# Patient Record
Sex: MALE | Race: WHITE | Employment: FULL TIME | URBAN - METROPOLITAN AREA
[De-identification: names, ages, dates, MRNs, and addresses within clinical notes are randomized per-mention and may not be internally consistent; named-entity substitution may affect disease eponyms.]

---

## 2018-04-17 ENCOUNTER — HOSPITAL ENCOUNTER (OUTPATIENT)
Dept: PHYSICAL THERAPY | Age: 48
Setting detail: THERAPIES SERIES
Discharge: HOME | End: 2018-04-17
Payer: OTHER GOVERNMENT

## 2018-04-17 PROCEDURE — G8981 BODY POS CURRENT STATUS: HCPCS | Performed by: PHYSICAL THERAPIST

## 2018-04-17 PROCEDURE — G8982 BODY POS GOAL STATUS: HCPCS | Performed by: PHYSICAL THERAPIST

## 2018-04-17 PROCEDURE — 97110 THERAPEUTIC EXERCISES: CPT | Performed by: PHYSICAL THERAPIST

## 2018-04-17 PROCEDURE — G0283 ELEC STIM OTHER THAN WOUND: HCPCS | Performed by: PHYSICAL THERAPIST

## 2018-04-17 PROCEDURE — 97161 PT EVAL LOW COMPLEX 20 MIN: CPT | Performed by: PHYSICAL THERAPIST

## 2018-04-17 ASSESSMENT — PAIN DESCRIPTION - PROGRESSION: CLINICAL_PROGRESSION: GRADUALLY IMPROVING

## 2018-04-17 ASSESSMENT — PAIN DESCRIPTION - ONSET: ONSET: SUDDEN

## 2018-04-17 ASSESSMENT — PAIN DESCRIPTION - DESCRIPTORS: DESCRIPTORS: TENDER;SHARP

## 2018-04-17 ASSESSMENT — PAIN DESCRIPTION - ORIENTATION: ORIENTATION: LEFT

## 2018-04-17 ASSESSMENT — PAIN DESCRIPTION - LOCATION: LOCATION: BACK

## 2018-04-17 ASSESSMENT — PAIN SCALES - GENERAL: PAINLEVEL_OUTOF10: 4

## 2018-04-17 ASSESSMENT — PAIN DESCRIPTION - PAIN TYPE: TYPE: ACUTE PAIN

## 2018-04-20 ENCOUNTER — HOSPITAL ENCOUNTER (OUTPATIENT)
Dept: PHYSICAL THERAPY | Age: 48
Setting detail: THERAPIES SERIES
Discharge: HOME | End: 2018-04-20
Payer: OTHER GOVERNMENT

## 2018-04-24 ENCOUNTER — HOSPITAL ENCOUNTER (OUTPATIENT)
Dept: PHYSICAL THERAPY | Age: 48
Setting detail: THERAPIES SERIES
Discharge: HOME | End: 2018-04-24
Payer: OTHER GOVERNMENT

## 2018-04-24 PROCEDURE — 97110 THERAPEUTIC EXERCISES: CPT | Performed by: PHYSICAL THERAPIST

## 2018-04-24 PROCEDURE — G0283 ELEC STIM OTHER THAN WOUND: HCPCS | Performed by: PHYSICAL THERAPIST

## 2018-05-01 ENCOUNTER — HOSPITAL ENCOUNTER (OUTPATIENT)
Dept: PHYSICAL THERAPY | Age: 48
Setting detail: THERAPIES SERIES
Discharge: HOME | End: 2018-05-01
Payer: OTHER GOVERNMENT

## 2018-05-01 PROCEDURE — 97110 THERAPEUTIC EXERCISES: CPT | Performed by: PHYSICAL THERAPIST

## 2018-05-01 PROCEDURE — G0283 ELEC STIM OTHER THAN WOUND: HCPCS | Performed by: PHYSICAL THERAPIST

## 2018-05-08 ENCOUNTER — HOSPITAL ENCOUNTER (OUTPATIENT)
Dept: PHYSICAL THERAPY | Age: 48
Setting detail: THERAPIES SERIES
Discharge: HOME | End: 2018-05-08
Payer: OTHER GOVERNMENT

## 2018-05-08 PROCEDURE — 97110 THERAPEUTIC EXERCISES: CPT | Performed by: PHYSICAL THERAPIST

## 2018-05-08 PROCEDURE — G0283 ELEC STIM OTHER THAN WOUND: HCPCS | Performed by: PHYSICAL THERAPIST

## 2018-05-15 ENCOUNTER — HOSPITAL ENCOUNTER (OUTPATIENT)
Dept: PHYSICAL THERAPY | Age: 48
Setting detail: THERAPIES SERIES
Discharge: HOME | End: 2018-05-15
Payer: OTHER GOVERNMENT

## 2018-05-15 PROCEDURE — G0283 ELEC STIM OTHER THAN WOUND: HCPCS | Performed by: PHYSICAL THERAPIST

## 2018-05-15 PROCEDURE — 97110 THERAPEUTIC EXERCISES: CPT | Performed by: PHYSICAL THERAPIST

## 2018-05-18 ENCOUNTER — HOSPITAL ENCOUNTER (OUTPATIENT)
Dept: PHYSICAL THERAPY | Age: 48
Setting detail: THERAPIES SERIES
Discharge: HOME | End: 2018-05-18
Payer: OTHER GOVERNMENT

## 2018-05-18 NOTE — PROGRESS NOTES
[address]      Phone: [phone]  Fax: [phone]    Physical Therapy  Cancellation/No-show Note  Patient Name:  Poncho Cruz  :  1970   Date:  2018    For today's appointment patient:  [x]  Cancelled  []  Rescheduled appointment  []  No-show     Reason given by patient:  []  Patient ill  []  Conflicting appointment  []  No transportation    [x]  Conflict with work  []  No reason given  []  Other:     Comments:      Electronically signed by:  Ana Lemons, PT [id number]

## 2018-05-21 ENCOUNTER — OFFICE VISIT (OUTPATIENT)
Dept: VASCULAR SURGERY | Age: 48
End: 2018-05-21
Payer: COMMERCIAL

## 2018-05-21 DIAGNOSIS — I87.2 VENOUS INSUFFICIENCY OF LEFT LEG: Primary | ICD-10-CM

## 2018-05-21 PROCEDURE — G8419 CALC BMI OUT NRM PARAM NOF/U: HCPCS | Performed by: SURGERY

## 2018-05-21 PROCEDURE — 4004F PT TOBACCO SCREEN RCVD TLK: CPT | Performed by: SURGERY

## 2018-05-21 PROCEDURE — G8427 DOCREV CUR MEDS BY ELIG CLIN: HCPCS | Performed by: SURGERY

## 2018-05-21 PROCEDURE — 99213 OFFICE O/P EST LOW 20 MIN: CPT | Performed by: SURGERY

## 2018-05-21 RX ORDER — TIZANIDINE 4 MG/1
4 TABLET ORAL PRN
COMMUNITY
End: 2019-09-21

## 2018-05-25 ENCOUNTER — HOSPITAL ENCOUNTER (OUTPATIENT)
Dept: PHYSICAL THERAPY | Age: 48
Setting detail: THERAPIES SERIES
Discharge: HOME | End: 2018-05-25
Payer: OTHER GOVERNMENT

## 2018-05-25 NOTE — PROGRESS NOTES
[address] [facility]                Phone: [phone]  Fax: [phone]    Physical Therapy  Cancellation/No-show Note  Patient Name:  Kandy Mayes  :  1970   Date:  2018    For today's appointment patient:  [x]  Cancelled  []  Rescheduled appointment  []  No-show     Reason given by patient:  []  Patient ill  []  Conflicting appointment  []  No transportation    []  Conflict with work  []  No reason given  [x]  Other:   Out of town         Electronically signed by:    Rafael Ramsey DPT  [id number]

## 2018-05-29 ENCOUNTER — HOSPITAL ENCOUNTER (OUTPATIENT)
Dept: PHYSICAL THERAPY | Age: 48
Setting detail: THERAPIES SERIES
Discharge: HOME | End: 2018-05-29
Payer: OTHER GOVERNMENT

## 2018-05-29 PROCEDURE — 97110 THERAPEUTIC EXERCISES: CPT | Performed by: PHYSICAL THERAPIST

## 2018-05-29 PROCEDURE — G0283 ELEC STIM OTHER THAN WOUND: HCPCS | Performed by: PHYSICAL THERAPIST

## 2018-06-05 ENCOUNTER — HOSPITAL ENCOUNTER (OUTPATIENT)
Dept: PHYSICAL THERAPY | Age: 48
Setting detail: THERAPIES SERIES
Discharge: HOME | End: 2018-06-05
Payer: OTHER GOVERNMENT

## 2018-06-05 PROCEDURE — G0283 ELEC STIM OTHER THAN WOUND: HCPCS | Performed by: PHYSICAL THERAPIST

## 2018-06-05 PROCEDURE — 97110 THERAPEUTIC EXERCISES: CPT | Performed by: PHYSICAL THERAPIST

## 2018-06-12 ENCOUNTER — HOSPITAL ENCOUNTER (OUTPATIENT)
Dept: PHYSICAL THERAPY | Age: 48
Setting detail: THERAPIES SERIES
Discharge: HOME | End: 2018-06-12
Payer: OTHER GOVERNMENT

## 2018-06-12 PROCEDURE — G0283 ELEC STIM OTHER THAN WOUND: HCPCS | Performed by: PHYSICAL THERAPIST

## 2018-06-12 PROCEDURE — 97110 THERAPEUTIC EXERCISES: CPT | Performed by: PHYSICAL THERAPIST

## 2018-06-26 ENCOUNTER — HOSPITAL ENCOUNTER (OUTPATIENT)
Dept: PHYSICAL THERAPY | Age: 48
Setting detail: THERAPIES SERIES
Discharge: HOME | End: 2018-06-26
Payer: OTHER GOVERNMENT

## 2018-06-26 NOTE — PROGRESS NOTES
[address]      Phone: [phone]  Fax: [phone]    Physical Therapy  Cancellation/No-show Note  Patient Name:  Dora Love  :  1970   Date:  2018    For today's appointment patient:  [x]  Cancelled  []  Rescheduled appointment  []  No-show     Reason given by patient:  []  Patient ill  []  Conflicting appointment  []  No transportation    []  Conflict with work  []  No reason given  [x]  Other:     Comments:  Waiting on confirmation for approval of more visits from worker's comp    Electronically signed by:  Jose De Santiago, [address]

## 2018-07-03 ENCOUNTER — APPOINTMENT (OUTPATIENT)
Dept: PHYSICAL THERAPY | Age: 48
End: 2018-07-03
Payer: OTHER GOVERNMENT

## 2018-07-06 ENCOUNTER — HOSPITAL ENCOUNTER (OUTPATIENT)
Dept: PHYSICAL THERAPY | Age: 48
Setting detail: THERAPIES SERIES
Discharge: HOME | End: 2018-07-06
Payer: OTHER GOVERNMENT

## 2018-07-06 PROCEDURE — 97110 THERAPEUTIC EXERCISES: CPT | Performed by: PHYSICAL THERAPIST

## 2018-07-06 PROCEDURE — 97140 MANUAL THERAPY 1/> REGIONS: CPT | Performed by: PHYSICAL THERAPIST

## 2018-07-06 PROCEDURE — G0283 ELEC STIM OTHER THAN WOUND: HCPCS | Performed by: PHYSICAL THERAPIST

## 2018-07-10 ENCOUNTER — HOSPITAL ENCOUNTER (OUTPATIENT)
Dept: PHYSICAL THERAPY | Age: 48
Setting detail: THERAPIES SERIES
Discharge: HOME | End: 2018-07-10
Payer: OTHER GOVERNMENT

## 2018-07-10 PROCEDURE — G0283 ELEC STIM OTHER THAN WOUND: HCPCS | Performed by: PHYSICAL THERAPIST

## 2018-07-10 PROCEDURE — 97110 THERAPEUTIC EXERCISES: CPT | Performed by: PHYSICAL THERAPIST

## 2018-07-13 ENCOUNTER — HOSPITAL ENCOUNTER (OUTPATIENT)
Dept: PHYSICAL THERAPY | Age: 48
Setting detail: THERAPIES SERIES
Discharge: HOME | End: 2018-07-13
Payer: OTHER GOVERNMENT

## 2018-07-13 PROCEDURE — G0283 ELEC STIM OTHER THAN WOUND: HCPCS | Performed by: PHYSICAL THERAPIST

## 2018-07-13 PROCEDURE — 97140 MANUAL THERAPY 1/> REGIONS: CPT | Performed by: PHYSICAL THERAPIST

## 2018-07-13 PROCEDURE — 97530 THERAPEUTIC ACTIVITIES: CPT | Performed by: PHYSICAL THERAPIST
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
## 2018-07-20 ENCOUNTER — HOSPITAL ENCOUNTER (OUTPATIENT)
Dept: PHYSICAL THERAPY | Age: 48
Setting detail: THERAPIES SERIES
Discharge: HOME | End: 2018-07-20
Payer: OTHER GOVERNMENT

## 2018-07-20 PROCEDURE — 97110 THERAPEUTIC EXERCISES: CPT | Performed by: PHYSICAL THERAPIST

## 2018-07-20 PROCEDURE — G0283 ELEC STIM OTHER THAN WOUND: HCPCS | Performed by: PHYSICAL THERAPIST
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
## 2018-07-24 ENCOUNTER — HOSPITAL ENCOUNTER (OUTPATIENT)
Dept: PHYSICAL THERAPY | Age: 48
Setting detail: THERAPIES SERIES
Discharge: HOME | End: 2018-07-24
Payer: OTHER GOVERNMENT

## 2018-07-27 ENCOUNTER — HOSPITAL ENCOUNTER (OUTPATIENT)
Dept: PHYSICAL THERAPY | Age: 48
Setting detail: THERAPIES SERIES
Discharge: HOME | End: 2018-07-27
Payer: OTHER GOVERNMENT

## 2018-07-27 PROCEDURE — G0283 ELEC STIM OTHER THAN WOUND: HCPCS | Performed by: PHYSICAL THERAPIST

## 2018-07-27 PROCEDURE — 97110 THERAPEUTIC EXERCISES: CPT | Performed by: PHYSICAL THERAPIST
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
## 2018-08-03 ENCOUNTER — HOSPITAL ENCOUNTER (OUTPATIENT)
Dept: PHYSICAL THERAPY | Age: 48
Setting detail: THERAPIES SERIES
Discharge: HOME | End: 2018-08-03

## 2018-08-03 PROCEDURE — 97110 THERAPEUTIC EXERCISES: CPT | Performed by: PHYSICAL THERAPIST

## 2018-08-03 PROCEDURE — G0283 ELEC STIM OTHER THAN WOUND: HCPCS | Performed by: PHYSICAL THERAPIST

## 2018-08-07 ENCOUNTER — HOSPITAL ENCOUNTER (OUTPATIENT)
Dept: PHYSICAL THERAPY | Age: 48
Setting detail: THERAPIES SERIES
Discharge: HOME | End: 2018-08-07

## 2018-08-07 PROCEDURE — 97530 THERAPEUTIC ACTIVITIES: CPT

## 2018-08-07 PROCEDURE — G0283 ELEC STIM OTHER THAN WOUND: HCPCS
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
## 2018-08-24 ENCOUNTER — HOSPITAL ENCOUNTER (OUTPATIENT)
Dept: PHYSICAL THERAPY | Age: 48
Setting detail: THERAPIES SERIES
Discharge: HOME | End: 2018-08-24

## 2018-08-24 PROCEDURE — W0710 HC WORK COND PROG PER 15 MIN: HCPCS | Performed by: PHYSICAL THERAPIST

## 2018-08-28 ENCOUNTER — HOSPITAL ENCOUNTER (OUTPATIENT)
Dept: PHYSICAL THERAPY | Age: 48
Setting detail: THERAPIES SERIES
Discharge: HOME | End: 2018-08-28

## 2018-08-28 PROCEDURE — W0710 HC WORK COND PROG PER 15 MIN: HCPCS | Performed by: PHYSICAL THERAPIST
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
## 2018-08-31 ENCOUNTER — HOSPITAL ENCOUNTER (OUTPATIENT)
Dept: PHYSICAL THERAPY | Age: 48
Setting detail: THERAPIES SERIES
Discharge: HOME | End: 2018-08-31

## 2018-08-31 NOTE — PROGRESS NOTES
[address]      Phone: [phone]  Fax: [phone]    Physical Therapy  Cancellation/No-show Note  Patient Name:  Serena Hernandez  :  1970   Date:  2018    For today's appointment patient:  [x]  Cancelled  []  Rescheduled appointment  []  No-show     Reason given by patient:  []  Patient ill  [x]  Conflicting appointment  []  No transportation    []  Conflict with work  []  No reason given  []  Other:     Comments:      Electronically signed by:  Norma Farmer, PT [id number]

## 2018-09-04 ENCOUNTER — HOSPITAL ENCOUNTER (OUTPATIENT)
Dept: PHYSICAL THERAPY | Age: 48
Setting detail: THERAPIES SERIES
Discharge: HOME | End: 2018-09-04
Payer: COMMERCIAL

## 2018-09-04 PROCEDURE — W0710 HC WORK COND PROG PER 15 MIN: HCPCS
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
## 2018-09-07 ENCOUNTER — HOSPITAL ENCOUNTER (OUTPATIENT)
Dept: PHYSICAL THERAPY | Age: 48
Setting detail: THERAPIES SERIES
Discharge: HOME | End: 2018-09-07
Payer: COMMERCIAL

## 2018-09-14 ENCOUNTER — HOSPITAL ENCOUNTER (OUTPATIENT)
Dept: PHYSICAL THERAPY | Age: 48
Setting detail: THERAPIES SERIES
Discharge: HOME | End: 2018-09-14
Payer: COMMERCIAL

## 2018-09-14 PROCEDURE — G8983 BODY POS D/C STATUS: HCPCS | Performed by: PHYSICAL THERAPIST

## 2018-09-14 PROCEDURE — 97530 THERAPEUTIC ACTIVITIES: CPT | Performed by: PHYSICAL THERAPIST

## 2018-09-14 PROCEDURE — G8982 BODY POS GOAL STATUS: HCPCS | Performed by: PHYSICAL THERAPIST
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
## 2018-12-17 ENCOUNTER — HOSPITAL ENCOUNTER (EMERGENCY)
Age: 48
Discharge: HOME | End: 2018-12-17

## 2018-12-17 ENCOUNTER — APPOINTMENT (OUTPATIENT)
Dept: GENERAL RADIOLOGY | Age: 48
End: 2018-12-17

## 2018-12-17 VITALS
TEMPERATURE: 98.3 F | DIASTOLIC BLOOD PRESSURE: 90 MMHG | RESPIRATION RATE: 16 BRPM | OXYGEN SATURATION: 98 % | WEIGHT: 240 LBS | HEART RATE: 71 BPM | HEIGHT: 75 IN | BODY MASS INDEX: 29.84 KG/M2 | SYSTOLIC BLOOD PRESSURE: 144 MMHG

## 2018-12-17 DIAGNOSIS — S90.31XA CONTUSION OF RIGHT HEEL, INITIAL ENCOUNTER: Primary | ICD-10-CM

## 2018-12-17 PROCEDURE — 73650 X-RAY EXAM OF HEEL: CPT

## 2018-12-17 PROCEDURE — 99283 EMERGENCY DEPT VISIT LOW MDM: CPT

## 2018-12-17 ASSESSMENT — PAIN DESCRIPTION - LOCATION: LOCATION: FOOT

## 2018-12-17 ASSESSMENT — PAIN SCALES - GENERAL: PAINLEVEL_OUTOF10: 10

## 2018-12-17 ASSESSMENT — PAIN DESCRIPTION - ORIENTATION: ORIENTATION: RIGHT

## 2018-12-17 ASSESSMENT — PAIN SCALES - WONG BAKER: WONGBAKER_NUMERICALRESPONSE: 0
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
## 2019-09-21 ENCOUNTER — HOSPITAL ENCOUNTER (EMERGENCY)
Age: 49
Discharge: HOME | End: 2019-09-21
Attending: EMERGENCY MEDICINE
Payer: COMMERCIAL

## 2019-09-21 VITALS
DIASTOLIC BLOOD PRESSURE: 94 MMHG | HEART RATE: 88 BPM | HEIGHT: 75 IN | SYSTOLIC BLOOD PRESSURE: 168 MMHG | WEIGHT: 255 LBS | BODY MASS INDEX: 31.71 KG/M2 | TEMPERATURE: 98.5 F | RESPIRATION RATE: 16 BRPM | OXYGEN SATURATION: 97 %

## 2019-09-21 DIAGNOSIS — S46.812A STRAIN OF LEFT TRAPEZIUS MUSCLE, INITIAL ENCOUNTER: Primary | ICD-10-CM

## 2019-09-21 DIAGNOSIS — T14.8XXA MUSCLE STRAIN: ICD-10-CM

## 2019-09-21 PROCEDURE — 99283 EMERGENCY DEPT VISIT LOW MDM: CPT

## 2019-09-21 PROCEDURE — 96372 THER/PROPH/DIAG INJ SC/IM: CPT

## 2019-09-21 PROCEDURE — 6360000002 HC RX W HCPCS: Performed by: EMERGENCY MEDICINE

## 2019-09-21 RX ORDER — KETOROLAC TROMETHAMINE 10 MG/1
10 TABLET, FILM COATED ORAL EVERY 6 HOURS PRN
Qty: 20 TABLET | Refills: 0 | Status: SHIPPED | OUTPATIENT
Start: 2019-09-21 | End: 2021-06-18

## 2019-09-21 RX ORDER — CYCLOBENZAPRINE HCL 10 MG
10 TABLET ORAL 3 TIMES DAILY PRN
Qty: 15 TABLET | Refills: 0 | Status: SHIPPED | OUTPATIENT
Start: 2019-09-21 | End: 2019-09-26

## 2019-09-21 RX ORDER — KETOROLAC TROMETHAMINE 30 MG/ML
30 INJECTION, SOLUTION INTRAMUSCULAR; INTRAVENOUS ONCE
Status: COMPLETED | OUTPATIENT
Start: 2019-09-21 | End: 2019-09-21

## 2019-09-21 RX ORDER — ORPHENADRINE CITRATE 30 MG/ML
60 INJECTION INTRAMUSCULAR; INTRAVENOUS ONCE
Status: COMPLETED | OUTPATIENT
Start: 2019-09-21 | End: 2019-09-21

## 2019-09-21 RX ADMIN — KETOROLAC TROMETHAMINE 30 MG: 30 INJECTION, SOLUTION INTRAMUSCULAR; INTRAVENOUS at 08:33

## 2019-09-21 RX ADMIN — ORPHENADRINE CITRATE 60 MG: 30 INJECTION INTRAMUSCULAR; INTRAVENOUS at 08:34

## 2019-09-21 ASSESSMENT — PAIN SCALES - GENERAL: PAINLEVEL_OUTOF10: 8

## 2019-09-21 ASSESSMENT — PAIN DESCRIPTION - ORIENTATION: ORIENTATION: LEFT

## 2019-09-21 ASSESSMENT — PAIN DESCRIPTION - DESCRIPTORS: DESCRIPTORS: SORE

## 2019-09-21 ASSESSMENT — PAIN DESCRIPTION - PAIN TYPE: TYPE: ACUTE PAIN

## 2019-09-21 ASSESSMENT — PAIN DESCRIPTION - FREQUENCY: FREQUENCY: CONTINUOUS

## 2019-09-21 ASSESSMENT — PAIN DESCRIPTION - LOCATION: LOCATION: BACK

## 2019-09-21 NOTE — ED NOTES
Discharge instructions given, patient verbalized their understanding, no other noted or stated problems at this time. Patient will follow up with primary doctor for care.      Cornelia Guillaume RN  09/21/19 9722

## 2021-02-11 ENCOUNTER — APPOINTMENT (OUTPATIENT)
Dept: CT IMAGING | Age: 51
End: 2021-02-11
Payer: OTHER GOVERNMENT

## 2021-02-11 ENCOUNTER — HOSPITAL ENCOUNTER (EMERGENCY)
Age: 51
Discharge: HOME | End: 2021-02-11
Attending: FAMILY MEDICINE
Payer: OTHER GOVERNMENT

## 2021-02-11 VITALS
WEIGHT: 242 LBS | HEART RATE: 88 BPM | OXYGEN SATURATION: 99 % | HEIGHT: 75 IN | DIASTOLIC BLOOD PRESSURE: 84 MMHG | SYSTOLIC BLOOD PRESSURE: 150 MMHG | RESPIRATION RATE: 16 BRPM | BODY MASS INDEX: 30.09 KG/M2 | TEMPERATURE: 96 F

## 2021-02-11 DIAGNOSIS — S39.012A LUMBOSACRAL STRAIN, INITIAL ENCOUNTER: ICD-10-CM

## 2021-02-11 DIAGNOSIS — M48.061 SPINAL STENOSIS AT L4-L5 LEVEL: Primary | ICD-10-CM
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
## 2021-02-11 DIAGNOSIS — M62.838 SPASM OF MUSCLE: ICD-10-CM

## 2021-02-11 PROCEDURE — 96372 THER/PROPH/DIAG INJ SC/IM: CPT

## 2021-02-11 PROCEDURE — 72131 CT LUMBAR SPINE W/O DYE: CPT

## 2021-02-11 PROCEDURE — 6360000002 HC RX W HCPCS: Performed by: PHYSICIAN ASSISTANT

## 2021-02-11 PROCEDURE — 99283 EMERGENCY DEPT VISIT LOW MDM: CPT

## 2021-02-11 RX ORDER — METHOCARBAMOL 500 MG/1
500 TABLET, FILM COATED ORAL 4 TIMES DAILY
Qty: 40 TABLET | Refills: 0 | Status: SHIPPED | OUTPATIENT
Start: 2021-02-11 | End: 2021-02-21

## 2021-02-11 RX ORDER — HYDROCODONE BITARTRATE AND ACETAMINOPHEN 5; 325 MG/1; MG/1
1 TABLET ORAL EVERY 6 HOURS PRN
Qty: 10 TABLET | Refills: 0 | Status: SHIPPED | OUTPATIENT
Start: 2021-02-11 | End: 2021-02-14

## 2021-02-11 RX ORDER — NAPROXEN 500 MG/1
500 TABLET ORAL 2 TIMES DAILY
Qty: 60 TABLET | Refills: 0 | Status: SHIPPED | OUTPATIENT
Start: 2021-02-11 | End: 2021-06-18

## 2021-02-11 RX ORDER — KETOROLAC TROMETHAMINE 30 MG/ML
30 INJECTION, SOLUTION INTRAMUSCULAR; INTRAVENOUS ONCE
Status: COMPLETED | OUTPATIENT
Start: 2021-02-11 | End: 2021-02-11

## 2021-02-11 RX ORDER — ORPHENADRINE CITRATE 30 MG/ML
60 INJECTION INTRAMUSCULAR; INTRAVENOUS ONCE
Status: COMPLETED | OUTPATIENT
Start: 2021-02-11 | End: 2021-02-11

## 2021-02-11 RX ORDER — PREDNISONE 20 MG/1
20 TABLET ORAL DAILY
COMMUNITY
End: 2021-06-18

## 2021-02-11 RX ADMIN — ORPHENADRINE CITRATE 60 MG: 60 INJECTION INTRAMUSCULAR; INTRAVENOUS at 12:34

## 2021-02-11 RX ADMIN — KETOROLAC TROMETHAMINE 30 MG: 30 INJECTION, SOLUTION INTRAMUSCULAR at 12:34

## 2021-02-11 ASSESSMENT — PAIN DESCRIPTION - DESCRIPTORS: DESCRIPTORS: DISCOMFORT

## 2021-02-11 ASSESSMENT — PAIN DESCRIPTION - LOCATION: LOCATION: BACK;BUTTOCKS

## 2021-02-11 NOTE — ED PROVIDER NOTES
ED Attending  CC: No       [address] [facility]  Department of Emergency Medicine   ED  Encounter Note  Admit Date/RoomTime: 2021 12:08 PM  ED Room:     NAME: Kamlia Astudillo  : 1970  MRN: [id number]     Chief Complaint:  Back Pain (slipped on ice fell onto back and tailbone)    History of Present Illness Mayo Lugo is a 52 y.o. old male with a prior history of no prior back problems, presents to the emergency department by private vehicle, for traumatic acute, aching, sharp and stabbing left greater than right, paraspinal lower lumbar spine and sacral spine pain without radiation, for 2 hour(s) prior to arrival.  There has been recent injury as it relates to today's visit. 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 Patient states he immediate was able to get up but does have a lot of pain that is worsening over the last few hours. Patient is able to ambulate but has pain. Patient has no numbness or tingling or weakness down his extremities bilaterally. Patient has no loss of bowel or bladder function or perianal or sacral numbness or tingling. Since onset the symptoms have been worsening and is moderate in severity. He has associated signs & symptoms of nothing additional.   He denies any bladder or bowel incontinence , new weakness, tingling or paresthesias, recent back injection, recent spinal surgery, recent spinal/chiropractic manipulation, history of IVDA, fever, abdominal pain, bladder incontinence, bowel incontinence, bladder retention, bladder urgency, bowel urgency, saddle paresthesias  or sacral numbness. The pain is aggraveated by any movement and relieved by nothing in particular. He is not currently enrolled in an pain management program or managed by PCP or back specialist.  Patient did not hit his head or lose consciousness. Patient is not any blood thinners. Patient did not take anything before coming here today. His vaccinations are all up-to-date. ROS   Pertinent positives and negatives are stated within HPI, all other systems reviewed and are negative. Past Medical History:  has a past medical history of Dog bite, H/O blood clots, Lupus ([facility] [address] and RA (rheumatoid arthritis) ([facility] [address] Surgical History:  has a past surgical history that includes Appendectomy; Cholecystectomy; Mastoid surgery; [place] tooth extraction; other surgical history (4/15/16); and Knee cartilage surgery (Left, 06/2017). Social History:  reports that he quit smoking about 26 years ago. He smoked 0.50 packs per day. His smokeless tobacco use includes chew. He reports current alcohol use of about 6.0 standard drinks of alcohol per week. He reports that he does not use drugs. Family History: family history includes Cancer in his maternal grandfather. Allergies: Patient has no known allergies. Physical Exam   Oxygen Saturation Interpretation: Normal.        ED Triage Vitals   BP Temp Temp Source Pulse Resp SpO2 Height Weight   02/11/21 1207 02/11/21 1207 02/11/21 1207 02/11/21 1207 02/11/21 1207 02/11/21 1207 02/11/21 1211 02/11/21 1211   (!) 150/84 96 °F (35.6 °C) Temporal 88 16 99 % 6' 3\" (1.905 m) 242 lb (109.8 kg)         Constitutional:  Alert, development consistent with age. NAD  HEENT:  NC/NT. Airway patent. No retropharyngeal or peritonsillar abscess  Neck:  Normal ROM. Supple. Respiratory:  Clear to auscultation and breath sounds equal.  CV:  Regular rate and rhythm, normal heart sounds, without pathological murmurs, ectopy, gallops, or rubs. No chest wall pain, no rib pain. No pain with deep inspiration. GI:  Abdomen Soft, nontender, good bowel sounds. No firm or pulsatile mass. Nonsurgical abdomen  Back: lower lumbar spine and sacral spine left greater than right and bilateral.             Tenderness: Moderate. Swelling: no.              Range of Motion: full range with pain. CVA Tenderness: No.            Straight leg raising:  Bilateral positive at 20 degrees. Skin:  no erythema, rash or swelling noted. No step-off deformities. No evidence of ecchymosis. No evidence of abscess. No evidence of any trauma. Patient is neurovascular and sensory intact  Distal Function:              Motor deficit: none. Sensory deficit: none. Pulse deficit: none. Calf Tenderness:  No Bilateral.               Edema:  none Both lower extremity(s). Deep Tendon Reflexes (DTR's) to bilateral knee's and ankle's are normo-reflexive   Gait:  normal.  Integument:  Normal turgor. Warm, dry, without visible rash. Lymphatics: No lymphangitis or adenopathy noted. Neurological:  Oriented. Motor functions intact. Lab / Imaging Results   (All laboratory and radiology results have been personally reviewed by myself)  Labs:  No results found for this visit on 02/11/21. Imaging: All Radiology results interpreted by Radiologist unless otherwise noted. CT LUMBAR SPINE WO CONTRAST   Final Result   1. No fracture or malalignment. 2.  Severe disc space narrowing at L4/L5 with central canal stenosis and   neural foraminal narrowing. MRI may be helpful for further evaluation of   lumbar spondylosis. ED Course / Medical Decision Making     Medications   ketorolac (TORADOL) injection 30 mg (30 mg Intramuscular Given 2/11/21 1234)   orphenadrine (NORFLEX) injection 60 mg (60 mg Intramuscular Given 2/11/21 1234)        Re-examination:  2/11/21       Time: 1240 patient did state that he drove here would like to try the Toradol first before having to call for a ride if more pain medication is needed. Patients condition remains unchanged.     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 Patient had a thorough examination and no step-off deformities were noted. Patient has no neurological deficits. Patient is able to ambulate but with pain. Patient denies any numbness or tingling or weakness down his lower extremities. Patient denies any perianal/sacral numbness or tingling. No loss of bowel or bladder function. Imaging was obtained based on moderate suspicion for fracture / bony abnormality, dislocation as per history/physical findings. Patient was noted to have at L4/L5 spinal stenosis. Patient will be diagnosed with a lumbar/sacral sprain as well as muscle spasms. Patient was educated that he was given Toradol and Norflex here in the emergency department he feels slightly better. Patient will be discharged home today with muscle relaxers that he can take during the day but was told he is unable to drive or work due to causing impairment. Patient is educated the risk, benefits and side effects of the medication he voiced understanding and agreed. Patient will also be given naproxen for which she was told alternate with Tylenol every 6-8 hours with food for decrease of his muscle inflammation. Patient will also be given a small dose of pain medication to only take for breakthrough pain. Patient was educated about this narcotic and his PDMP was checked and was found to be negative. Patient was educated that the risks such as narcotic abuse, constipation, nausea and vomiting with taking this medication he voiced understanding and accepted the full risks of taking this. Patient also was told he cannot drive on this medication. Patient will be set up with Workmen's Comp. to follow-up as an outpatient. Patient will be placed on light duty until he is able to be seen at [facility]. to be further assisted. Recommendations on CT scan are an MRI. Patient was educated but worsening signs symptoms and when to return back to the emergency department. 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 All Workmen's Comp. papers were filled out by myself and my attending physician. He voiced understanding and agreed with the plan and management. Patient is vitally stable and ready for outpatient follow-up. At this time the patient is without objective evidence of an acute process requiring inpatient management. History provided is without report of trauma, or neurological symptom. Exam shows evidence of NO radicular symptoms without myelopathy or neurovascular compromise. Patient has no significant risk for spontaneous infectious process and is afebrile & non-toxic. Given symptomatic therapy in ER and prescriptions for home along with appropriate instructions regarding taking medications with food and using caution for drowsiness and sedation. No alcohol or driving while taking medication. Any red flag symptoms were to return immediately to the ER for evaluation but otherwise PCP follow up for reevaluation. Plan of Care/Counseling:  I reviewed today's visit with the patient in addition to providing specific details for the plan of care and counseling regarding the diagnosis and prognosis. Questions are answered at this time and are agreeable with the plan. Assessment      1. Spinal stenosis at L4-L5 level    2. Fall due to slipping on ice or snow, initial encounter    3. Spasm of muscle    4. Lumbosacral strain, initial encounter      Plan   Discharge home.   Patient condition is stable    New Medications     Discharge Medication List as of 2/11/2021  2:20 PM      START taking these medications    Details methocarbamol (ROBAXIN) 500 MG tablet Take 1 tablet by mouth 4 times daily for 10 days Do not drive on this medication, Disp-40 tablet, R-0Print      HYDROcodone-acetaminophen (NORCO) 5-325 MG per tablet Take 1 tablet by mouth every 6 hours as needed for Pain for up to 3 days. Intended supply: 3 days. Take lowest dose possible to manage pain, Disp-10 tablet, R-0Print      naproxen (NAPROSYN) 500 MG tablet Take 1 tablet by mouth 2 times daily Please alternate with Tylenol 650 mg every 6-8 hours with food to help decrease the inflammation, Disp-60 tablet, R-0Print           Electronically signed by Eve Ybarra PA-C   DD: 2/11/21  **This report was transcribed using voice recognition software. Every effort was made to ensure accuracy; however, inadvertent computerized transcription errors may be present.   END OF ED PROVIDER NOTE        Eve Ybarra PA-C  02/11/21 3018

## 2021-02-11 NOTE — LETTER
[address] [facility] Emergency Department  [id number] [facility] [address]  Phone: [phone]               February 11, 2021    Patient: Tip Hair   YOB: 1970   Date of Visit: 2/11/2021       To Whom It May Concern:    Tip Hair was seen and treated in our emergency department on 2/11/2021. He may return to work on 02/15/2021.       Sincerely,       Gwen Noyola RN         Signature:__________________________________

## 2021-02-11 NOTE — ED NOTES
Patient phoned with questions regarding return to work. Chart accessed for this purpose.      Mariya Reddy RN  02/11/21 5270

## 2021-03-30 ENCOUNTER — HOSPITAL ENCOUNTER (OUTPATIENT)
Dept: MRI IMAGING | Age: 51
Discharge: HOME | End: 2021-04-01
Payer: OTHER GOVERNMENT

## 2021-03-30 DIAGNOSIS — S39.012D STRAIN OF LUMBAR REGION, SUBSEQUENT ENCOUNTER: ICD-10-CM

## 2021-03-30 PROCEDURE — 72148 MRI LUMBAR SPINE W/O DYE: CPT

## 2021-06-18 ENCOUNTER — HOSPITAL ENCOUNTER (EMERGENCY)
Age: 51
Discharge: ANOTHER HOSPITAL | End: 2021-06-18
Attending: EMERGENCY MEDICINE
Payer: COMMERCIAL

## 2021-06-18 ENCOUNTER — HOSPITAL ENCOUNTER (INPATIENT)
Age: 51
LOS: 1 days | Discharge: HOME | DRG: 301 | End: 2021-06-19
Attending: INTERNAL MEDICINE | Admitting: INTERNAL MEDICINE
Payer: COMMERCIAL

## 2021-06-18 VITALS
OXYGEN SATURATION: 100 % | TEMPERATURE: 98 F | DIASTOLIC BLOOD PRESSURE: 81 MMHG | HEART RATE: 100 BPM | WEIGHT: 242 LBS | SYSTOLIC BLOOD PRESSURE: 145 MMHG | HEIGHT: 75 IN | BODY MASS INDEX: 30.09 KG/M2 | RESPIRATION RATE: 16 BRPM

## 2021-06-18 DIAGNOSIS — I82.411 ACUTE DEEP VEIN THROMBOSIS (DVT) OF FEMORAL VEIN OF RIGHT LOWER EXTREMITY (HCC): Primary | ICD-10-CM

## 2021-06-18 PROBLEM — O22.30 DVT (DEEP VEIN THROMBOSIS) IN PREGNANCY: Status: ACTIVE | Noted: 2021-06-18

## 2021-06-18 LAB
ANION GAP SERPL CALCULATED.3IONS-SCNC: 7 MMOL/L (ref 7–16)
APTT: 30.5 SEC (ref 24.5–35.1)
APTT: 77.3 SEC (ref 24.5–35.1)
BUN BLDV-MCNC: 8 MG/DL (ref 6–20)
CALCIUM SERPL-MCNC: 8.4 MG/DL (ref 8.6–10.2)
CHLORIDE BLD-SCNC: 101 MMOL/L (ref 98–107)
CO2: 28 MMOL/L (ref 22–29)
CREAT SERPL-MCNC: 0.8 MG/DL (ref 0.7–1.2)
GFR AFRICAN AMERICAN: >60
GFR NON-AFRICAN AMERICAN: >60 ML/MIN/1.73
GLUCOSE BLD-MCNC: 113 MG/DL (ref 74–99)
HCT VFR BLD CALC: 41.8 % (ref 37–54)
HCT VFR BLD CALC: 44.5 % (ref 37–54)
HEMOGLOBIN: 13.8 G/DL (ref 12.5–16.5)
HEMOGLOBIN: 14.6 G/DL (ref 12.5–16.5)
INR BLD: 1.1
MCH RBC QN AUTO: 30.5 PG (ref 26–35)
MCH RBC QN AUTO: 30.7 PG (ref 26–35)
MCHC RBC AUTO-ENTMCNC: 32.8 % (ref 32–34.5)
MCHC RBC AUTO-ENTMCNC: 33 % (ref 32–34.5)
MCV RBC AUTO: 92.9 FL (ref 80–99.9)
MCV RBC AUTO: 93.1 FL (ref 80–99.9)
PDW BLD-RTO: 13.2 FL (ref 11.5–15)
PDW BLD-RTO: 13.6 FL (ref 11.5–15)
PLATELET # BLD: 80 E9/L (ref 130–450)
PLATELET # BLD: 80 E9/L (ref 130–450)
PLATELET CONFIRMATION: NORMAL
PMV BLD AUTO: 11.9 FL (ref 7–12)
PMV BLD AUTO: 12.4 FL (ref 7–12)
POTASSIUM SERPL-SCNC: 4.3 MMOL/L (ref 3.5–5)
PROTHROMBIN TIME: 11.5 SEC (ref 9.3–12.4)
RBC # BLD: 4.5 E12/L (ref 3.8–5.8)
RBC # BLD: 4.78 E12/L (ref 3.8–5.8)
REASON FOR REJECTION: NORMAL
REJECTED TEST: NORMAL
SARS-COV-2, NAAT: NOT DETECTED
SODIUM BLD-SCNC: 136 MMOL/L (ref 132–146)
WBC # BLD: 1.5 E9/L (ref 4.5–11.5)
WBC # BLD: 2.1 E9/L (ref 4.5–11.5)

## 2021-06-18 PROCEDURE — 87635 SARS-COV-2 COVID-19 AMP PRB: CPT

## 2021-06-18 PROCEDURE — 36415 COLL VENOUS BLD VENIPUNCTURE: CPT

## 2021-06-18 PROCEDURE — 2580000003 HC RX 258: Performed by: INTERNAL MEDICINE

## 2021-06-18 PROCEDURE — 96365 THER/PROPH/DIAG IV INF INIT: CPT

## 2021-06-18 PROCEDURE — 6360000002 HC RX W HCPCS: Performed by: EMERGENCY MEDICINE

## 2021-06-18 PROCEDURE — 85730 THROMBOPLASTIN TIME PARTIAL: CPT

## 2021-06-18 PROCEDURE — 96366 THER/PROPH/DIAG IV INF ADDON: CPT

## 2021-06-18 PROCEDURE — 96375 TX/PRO/DX INJ NEW DRUG ADDON: CPT

## 2021-06-18 PROCEDURE — 85027 COMPLETE CBC AUTOMATED: CPT

## 2021-06-18 PROCEDURE — 85610 PROTHROMBIN TIME: CPT

## 2021-06-18 PROCEDURE — 80048 BASIC METABOLIC PNL TOTAL CA: CPT

## 2021-06-18 PROCEDURE — 2060000000 HC ICU INTERMEDIATE R&B

## 2021-06-18 PROCEDURE — 99284 EMERGENCY DEPT VISIT MOD MDM: CPT

## 2021-06-18 PROCEDURE — 99253 IP/OBS CNSLTJ NEW/EST LOW 45: CPT | Performed by: SURGERY

## 2021-06-18 RX ORDER — SODIUM CHLORIDE 9 MG/ML
25 INJECTION, SOLUTION INTRAVENOUS PRN
Status: DISCONTINUED | OUTPATIENT
Start: 2021-06-18 | End: 2021-06-19 | Stop reason: HOSPADM

## 2021-06-18 RX ORDER — SODIUM CHLORIDE 0.9 % (FLUSH) 0.9 %
5-40 SYRINGE (ML) INJECTION EVERY 12 HOURS SCHEDULED
Status: DISCONTINUED | OUTPATIENT
Start: 2021-06-18 | End: 2021-06-19 | Stop reason: HOSPADM

## 2021-06-18 RX ORDER — POLYETHYLENE GLYCOL 3350 17 G/17G
17 POWDER, FOR SOLUTION ORAL DAILY PRN
Status: CANCELLED | OUTPATIENT
Start: 2021-06-18

## 2021-06-18 RX ORDER — HEPARIN SODIUM 1000 [USP'U]/ML
80 INJECTION, SOLUTION INTRAVENOUS; SUBCUTANEOUS PRN
Status: CANCELLED | OUTPATIENT
Start: 2021-06-18

## 2021-06-18 RX ORDER — SODIUM CHLORIDE 9 MG/ML
INJECTION, SOLUTION INTRAVENOUS CONTINUOUS
Status: DISCONTINUED | OUTPATIENT
Start: 2021-06-18 | End: 2021-06-19

## 2021-06-18 RX ORDER — HEPARIN SODIUM 1000 [USP'U]/ML
40 INJECTION, SOLUTION INTRAVENOUS; SUBCUTANEOUS PRN
Status: CANCELLED | OUTPATIENT
Start: 2021-06-18

## 2021-06-18 RX ORDER — POLYETHYLENE GLYCOL 3350 17 G/17G
17 POWDER, FOR SOLUTION ORAL DAILY PRN
Status: DISCONTINUED | OUTPATIENT
Start: 2021-06-18 | End: 2021-06-19 | Stop reason: HOSPADM

## 2021-06-18 RX ORDER — ONDANSETRON 2 MG/ML
4 INJECTION INTRAMUSCULAR; INTRAVENOUS EVERY 6 HOURS PRN
Status: CANCELLED | OUTPATIENT
Start: 2021-06-18

## 2021-06-18 RX ORDER — HEPARIN SODIUM 10000 [USP'U]/100ML
5-30 INJECTION, SOLUTION INTRAVENOUS CONTINUOUS
Status: DISCONTINUED | OUTPATIENT
Start: 2021-06-18 | End: 2021-06-19

## 2021-06-18 RX ORDER — HEPARIN SODIUM 10000 [USP'U]/100ML
5-30 INJECTION, SOLUTION INTRAVENOUS CONTINUOUS
Status: DISCONTINUED | OUTPATIENT
Start: 2021-06-18 | End: 2021-06-18 | Stop reason: HOSPADM

## 2021-06-18 RX ORDER — HEPARIN SODIUM 1000 [USP'U]/ML
40 INJECTION, SOLUTION INTRAVENOUS; SUBCUTANEOUS PRN
Status: DISCONTINUED | OUTPATIENT
Start: 2021-06-18 | End: 2021-06-18 | Stop reason: HOSPADM

## 2021-06-18 RX ORDER — ONDANSETRON 4 MG/1
4 TABLET, ORALLY DISINTEGRATING ORAL EVERY 8 HOURS PRN
Status: DISCONTINUED | OUTPATIENT
Start: 2021-06-18 | End: 2021-06-19 | Stop reason: HOSPADM

## 2021-06-18 RX ORDER — SODIUM CHLORIDE 9 MG/ML
INJECTION, SOLUTION INTRAVENOUS CONTINUOUS
Status: CANCELLED | OUTPATIENT
Start: 2021-06-18

## 2021-06-18 RX ORDER — HEPARIN SODIUM 1000 [USP'U]/ML
80 INJECTION, SOLUTION INTRAVENOUS; SUBCUTANEOUS PRN
Status: DISCONTINUED | OUTPATIENT
Start: 2021-06-18 | End: 2021-06-18 | Stop reason: HOSPADM

## 2021-06-18 RX ORDER — HYDROCODONE BITARTRATE AND ACETAMINOPHEN 10; 325 MG/1; MG/1
1 TABLET ORAL EVERY 4 HOURS PRN
Status: CANCELLED | OUTPATIENT
Start: 2021-06-18

## 2021-06-18 RX ORDER — HEPARIN SODIUM 1000 [USP'U]/ML
40 INJECTION, SOLUTION INTRAVENOUS; SUBCUTANEOUS PRN
Status: DISCONTINUED | OUTPATIENT
Start: 2021-06-18 | End: 2021-06-19 | Stop reason: HOSPADM

## 2021-06-18 RX ORDER — HEPARIN SODIUM 1000 [USP'U]/ML
80 INJECTION, SOLUTION INTRAVENOUS; SUBCUTANEOUS PRN
Status: DISCONTINUED | OUTPATIENT
Start: 2021-06-18 | End: 2021-06-19 | Stop reason: HOSPADM

## 2021-06-18 RX ORDER — ONDANSETRON 2 MG/ML
4 INJECTION INTRAMUSCULAR; INTRAVENOUS EVERY 6 HOURS PRN
Status: DISCONTINUED | OUTPATIENT
Start: 2021-06-18 | End: 2021-06-19 | Stop reason: HOSPADM

## 2021-06-18 RX ORDER — SODIUM CHLORIDE 0.9 % (FLUSH) 0.9 %
5-40 SYRINGE (ML) INJECTION PRN
Status: CANCELLED | OUTPATIENT
Start: 2021-06-18

## 2021-06-18 RX ORDER — BELIMUMAB 200 MG/ML
SOLUTION SUBCUTANEOUS
COMMUNITY
Start: 2021-03-17

## 2021-06-18 RX ORDER — SODIUM CHLORIDE 0.9 % (FLUSH) 0.9 %
5-40 SYRINGE (ML) INJECTION EVERY 12 HOURS SCHEDULED
Status: CANCELLED | OUTPATIENT
Start: 2021-06-18

## 2021-06-18 RX ORDER — HEPARIN SODIUM 1000 [USP'U]/ML
80 INJECTION, SOLUTION INTRAVENOUS; SUBCUTANEOUS ONCE
Status: COMPLETED | OUTPATIENT
Start: 2021-06-18 | End: 2021-06-18

## 2021-06-18 RX ORDER — SODIUM CHLORIDE 0.9 % (FLUSH) 0.9 %
5-40 SYRINGE (ML) INJECTION PRN
Status: DISCONTINUED | OUTPATIENT
Start: 2021-06-18 | End: 2021-06-19 | Stop reason: HOSPADM

## 2021-06-18 RX ORDER — HYDROCODONE BITARTRATE AND ACETAMINOPHEN 10; 325 MG/1; MG/1
1 TABLET ORAL EVERY 4 HOURS PRN
Status: DISCONTINUED | OUTPATIENT
Start: 2021-06-18 | End: 2021-06-19 | Stop reason: HOSPADM

## 2021-06-18 RX ORDER — HEPARIN SODIUM 10000 [USP'U]/100ML
5-30 INJECTION, SOLUTION INTRAVENOUS CONTINUOUS
Status: CANCELLED | OUTPATIENT
Start: 2021-06-18

## 2021-06-18 RX ORDER — ONDANSETRON 4 MG/1
4 TABLET, ORALLY DISINTEGRATING ORAL EVERY 8 HOURS PRN
Status: CANCELLED | OUTPATIENT
Start: 2021-06-18

## 2021-06-18 RX ORDER — SODIUM CHLORIDE 9 MG/ML
25 INJECTION, SOLUTION INTRAVENOUS PRN
Status: CANCELLED | OUTPATIENT
Start: 2021-06-18

## 2021-06-18 RX ORDER — HEPARIN SODIUM 1000 [USP'U]/ML
80 INJECTION, SOLUTION INTRAVENOUS; SUBCUTANEOUS ONCE
Status: CANCELLED | OUTPATIENT
Start: 2021-06-18 | End: 2021-06-18

## 2021-06-18 RX ORDER — ACETAMINOPHEN 325 MG/1
650 TABLET ORAL EVERY 6 HOURS PRN
Status: DISCONTINUED | OUTPATIENT
Start: 2021-06-18 | End: 2021-06-19 | Stop reason: HOSPADM

## 2021-06-18 RX ORDER — HEPARIN SODIUM 1000 [USP'U]/ML
80 INJECTION, SOLUTION INTRAVENOUS; SUBCUTANEOUS ONCE
Status: DISCONTINUED | OUTPATIENT
Start: 2021-06-18 | End: 2021-06-19

## 2021-06-18 RX ORDER — ACETAMINOPHEN 650 MG/1
650 SUPPOSITORY RECTAL EVERY 6 HOURS PRN
Status: CANCELLED | OUTPATIENT
Start: 2021-06-18

## 2021-06-18 RX ORDER — ACETAMINOPHEN 325 MG/1
650 TABLET ORAL EVERY 6 HOURS PRN
Status: CANCELLED | OUTPATIENT
Start: 2021-06-18

## 2021-06-18 RX ORDER — ACETAMINOPHEN 650 MG/1
650 SUPPOSITORY RECTAL EVERY 6 HOURS PRN
Status: DISCONTINUED | OUTPATIENT
Start: 2021-06-18 | End: 2021-06-19 | Stop reason: HOSPADM

## 2021-06-18 RX ADMIN — SODIUM CHLORIDE: 9 INJECTION, SOLUTION INTRAVENOUS at 22:31

## 2021-06-18 RX ADMIN — HEPARIN SODIUM 8780 UNITS: 1000 INJECTION INTRAVENOUS; SUBCUTANEOUS at 12:34

## 2021-06-18 RX ADMIN — HEPARIN SODIUM AND DEXTROSE 18 UNITS/KG/HR: 10000; 5 INJECTION INTRAVENOUS at 12:33

## 2021-06-18 ASSESSMENT — PAIN DESCRIPTION - LOCATION
LOCATION: LEG
LOCATION: LEG

## 2021-06-18 ASSESSMENT — PAIN DESCRIPTION - DESCRIPTORS
DESCRIPTORS: ACHING;DISCOMFORT;DULL
DESCRIPTORS: CRAMPING;CONSTANT

## 2021-06-18 ASSESSMENT — PAIN DESCRIPTION - ORIENTATION
ORIENTATION: RIGHT
ORIENTATION: RIGHT

## 2021-06-18 ASSESSMENT — PAIN DESCRIPTION - PAIN TYPE
TYPE: ACUTE PAIN
TYPE: ACUTE PAIN

## 2021-06-18 ASSESSMENT — PAIN SCALES - GENERAL
PAINLEVEL_OUTOF10: 1
PAINLEVEL_OUTOF10: 10

## 2021-06-18 ASSESSMENT — PAIN DESCRIPTION - ONSET: ONSET: ON-GOING

## 2021-06-18 NOTE — ED NOTES
Patient is up to date and aware that he is being transported down town and that we are currently awaiting a room assignment.       Rosa Rendon RN  06/18/21 0127

## 2021-06-18 NOTE — CONSULTS
Vascular Surgery Consultation Note    Reason for Consult: DVT    HPI :    This is a 52 y.o. male who is admitted to the hospital for treatment of DVT. Patient went to an urgent care found to have right SFV and lower leg vein DVT. 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 This is his second DVT. First 1 was 4 to 5 years ago located in the left leg. Patient currently on Eliquis. Patient states that he has been taking his Eliquis and has not missed a dose. No shortness of breath or tachycardia-no increasing oxygen requirements-no evidence of PE on physical exam.  Patient complains of significant pain right lower extremity. No motor or sensory loss. No visible skin changes. He is missed at least 4 doses of Eliquis in the last month that he can recall.       ROS : Negative if blank [], Positive if [x]  General Vascular   [] Fevers [] Claudication (Blocks)   [] Chills [] Rest Pain   [] Weight Loss [] Tissue Loss   [] Chest Pain [] Clotting Disorder    [] SOB at rest [x] Leg Swelling   [] SOB with exertion [x] DVT/PE      [] Nausea    [] Vomitting [] Stroke/TIA   [] Abdominal Pain [] Focal weakness   [] Melena [] Slurred Speech   [] Hematochezia [] Vision Changes   [] Hematuria    [] Dysuria [] Hx of Central Catheters   [] Wears Glasses/Contacts  [] Dialysis and If so date initiated   [] Blindness     []  Hand Dominant   [] Difficulty swallowing        Past Medical History:   Diagnosis Date    Dog bite 08/24/2016    rabies vaccinations s/p dog bite    H/O blood clots     [facility] [address]     RA (rheumatoid arthritis) ([facility] [address]         Past Surgical History:   Procedure Laterality Date    APPENDECTOMY      CHOLECYSTECTOMY      KNEE CARTILAGE SURGERY Left 06/2017    MASTOID SURGERY      OTHER SURGICAL HISTORY  4/15/16    radiofrequency ablation left greater saphaneous vein and stab phlebectomies    WISDOM TOOTH EXTRACTION       Current Medications:    heparin (PORCINE) Infusion 18 Units/kg/hr (21 1233)      heparin (porcine), heparin (porcine)        Allergies:  Patient has no known allergies. Social History     Socioeconomic History    Marital status: Single     Spouse name: Not on file    Number of children: Not on file    Years of education: Not on file    Highest education level: Not on file   Occupational History    Not on file   Tobacco Use    Smoking status: Former Smoker     Packs/day: 0.50     Quit date: 11/10/1994     Years since quittin.6    Smokeless tobacco: Current User     Types: Chew   Vaping Use    Vaping Use: Never used   Substance and Sexual Activity    Alcohol use: Yes     Alcohol/week: 6.0 standard drinks     Types: 6 Cans of beer per week     Comment: beer, a feww days per week    Drug use: No    Sexual activity: Not on file   Other Topics Concern    Not on file   Social History Narrative    Not on file     Social Determinants of Health     Financial Resource Strain:     Difficulty of Paying Living Expenses:    Food Insecurity:     Worried About Running Out of Food in the Last Year:     Ran Out of Food in the Last Year:    Transportation Needs:     Lack of Transportation (Medical):      Lack of Transportation (Non-Medical):    Physical Activity:     Days of Exercise per Week:     Minutes of Exercise per Session:    Stress:     Feeling of Stress :    Social Connections:     Frequency of Communication with Friends and Family:     Frequency of Social Gatherings with Friends and Family:     Attends Synagogue Services:     Active Member of Clubs or Organizations:     Attends Club or Organization Meetings:     Marital Status:    Intimate Partner Violence:     Fear of Current or Ex-Partner:     Emotionally Abused:     Physically Abused:     Sexually Abused:         Family History   Problem Relation Age of Onset    Cancer Maternal Grandfather         throat       PHYSICAL EXAM:    BP (!) 142/82   Pulse 78   Temp 100 °F (37.8 °C) (Temporal)   Resp 20   Ht 6' 3\" (1.905 m)   Wt 242 lb (109.8 kg)   SpO2 99%   BMI 30.25 kg/m²   CONSTITUTIONAL:  awake, alert, cooperative, no apparent distress, and appears stated age  Normal  EYES:  lids and lashes normal, sclera clear and conjunctiva normal  ENT:  normocepalic, without obvious abnormality, external ears without lesions  NECK:  supple, symmetrical, trachea midline,no jugular venous distension, no carotid bruits  HEMATOLOGIC/LYMPHATICS:  no cervical lymphadenopathy  LUNGS:  no increased work of breathing, good air exchange  CARDIOVASCULAR:  regular rate and rhythm no murmur noted  ABDOMEN:  soft, non-distended, non-tender, Aorta is not palpable  SKIN:  no bruising or bleeding  EXTREMITIES:     R LE no edema. No previous incisions. L LE mild edema. No skin changes. Motor 5 out of 5 plantar dorsiflexion EHL knee extension knee flexion. No sensory changes. R femoral 2+ L femoral  2+   R popliteal 2+ L popliteal 2+   R posterior tibial 2+ L posterior tibial 2+   R dorsalis pedis 2+ L dorsalis pedis 2+     LABS:    Lab Results   Component Value Date    WBC 2.1 (L) 06/18/2021    HGB 14.6 06/18/2021    HCT 44.5 06/18/2021    PLT 80 (L) 06/18/2021    PROTIME 11.5 06/18/2021    INR 1.1 06/18/2021    K 4.3 06/18/2021    BUN 8 06/18/2021    CREATININE 0.8 06/18/2021       RADIOLOGY:    Assesment/Plan    Plan to transfer the patient downtown  Continue heparin drip  Patient would benefit from a hypercoagulable work-up by hematology group  Second unprovoked DVT in 5 years -the second of which while on Eliquis   Will eval for possible need for thrombolysis downtown    Lindy Yoder MD      This patient was seen and examined. Agree with above. He is alert and oriented answers questions appropriately no acute distress he sitting in the chair  Skin: Is warm and dry no change in turgor no jaundice no scleral icterus. Bilateral lower extremity varicose veins are noted.   The right leg is swollen compared to contra left side but both legs are swollen. The right certainly is more swollen than the contralateral left side. He has bilateral lower extremity chronic venous stasis changes  HEENT head is normocephalic atraumatic trach is midline no jugular venous distention normal range of motion no carotid bruits  Cardiac: Is currently regular rate and rhythm no murmur rub or gallop  Pulmonary: Clear to auscultation bilaterally no wheezes rales or rhonchi  Abdomen: Soft nontender no rebound or guarding. Positive bowel sounds  Extremities: Bilateral palpable brachial radial pulses, bilateral DP PT in 1-2+ motor and sensation intact bilateral lower extremity varicose veins. The right leg is swollen. It is not tense. There is no gross signs of phlegmasia. Motor and sensation are intact  Musculoskeletal: No gross joint deformities other than scarring of the left lower extremity  Neuro: Is grossly intact bilaterally any gross cranial deficit  Psych: Affect: Judgment: Mentation appear to be appropriate    I reviewed his laboratories. His platelet count is decreased and his white count is decreased. According to the patient's history this is not a new finding. Assessment and plan. #1 right lower extremity DVT. When I talk with him he noticed swelling of his right lower extremity starting Monday. He has a history of deep venous thrombosis of his left lower extremity many years ago. He has been evaluated and treated by my partner after his DVT. Several years ago he underwent radiofrequency ablation of the saphenous vein and stab phlebectomy. He has a history of lupus and he believes lupus anticoagulant. He has been on anticoagulation initially he believes started with Lovenox followed by Coumadin followed by Eliquis. He also was on Xarelto at one point but was switched to Eliquis. He has missed at least 4 doses he believes in the last month.     At this point I would necessarily consider this a failure of anticoagulation. Secondary to the fact that he is missed multiple doses I think he probably would be a right maintaining Eliquis. We talked about the possibility of failure of anticoagulation and the additional therapies including an inferior vena cava filter. At this point he does not wish to proceed with that route. We have also consulted hematology oncology. He will need longstanding compression stockings to aid in postphlebitic syndrome and will wait to see what hematology says. We currently switch him over to heparin. I had a long discussion with the patient and family at the bedside regarding the above options. At this point he wishes to see if there is another option as far as anticoagulation versus continuing with Eliquis. I could not personally locate his ultrasound examination.   Edel Mckenzie

## 2021-06-18 NOTE — PROGRESS NOTES
Called access center @[phone] to transfer to [facility]. Dr. David Bruno aware and requested downtown. Dr. Cheyenne Varghese requested to speak to Dr. Magalie Vann who is patients PCP. Admits own downtown.

## 2021-06-18 NOTE — PROGRESS NOTES
Per Dr. Chema Dias, called Dr. Krystian Davis to discuss pt @ [phone]. Dr. Dread Estrada not in office, Dr. Chema Dias spoke to Dr. Myriam Knowles @ this time.

## 2021-06-18 NOTE — PROGRESS NOTES
Dr. Pieter Mendes. Dimple Sauceda called via [facility] @ [phone]. Dr. Elisabet Pennington spoke to him @ this time. Pt is accepted. Awaiting bed assignment.

## 2021-06-19 VITALS
RESPIRATION RATE: 16 BRPM | WEIGHT: 242 LBS | HEIGHT: 75 IN | TEMPERATURE: 98.4 F | DIASTOLIC BLOOD PRESSURE: 77 MMHG | HEART RATE: 102 BPM | OXYGEN SATURATION: 95 % | SYSTOLIC BLOOD PRESSURE: 138 MMHG | BODY MASS INDEX: 30.09 KG/M2

## 2021-06-19 LAB
ANION GAP SERPL CALCULATED.3IONS-SCNC: 10 MMOL/L (ref 7–16)
BUN BLDV-MCNC: 6 MG/DL (ref 6–20)
CALCIUM SERPL-MCNC: 7.8 MG/DL (ref 8.6–10.2)
CHLORIDE BLD-SCNC: 102 MMOL/L (ref 98–107)
CO2: 26 MMOL/L (ref 22–29)
CREAT SERPL-MCNC: 0.8 MG/DL (ref 0.7–1.2)
GFR AFRICAN AMERICAN: >60
GFR NON-AFRICAN AMERICAN: >60 ML/MIN/1.73
GLUCOSE BLD-MCNC: 120 MG/DL (ref 74–99)
HCT VFR BLD CALC: 40.3 % (ref 37–54)
HEMOGLOBIN: 13.4 G/DL (ref 12.5–16.5)
MAGNESIUM: 1.9 MG/DL (ref 1.6–2.6)
MCH RBC QN AUTO: 30.9 PG (ref 26–35)
MCHC RBC AUTO-ENTMCNC: 33.3 % (ref 32–34.5)
MCV RBC AUTO: 92.9 FL (ref 80–99.9)
PDW BLD-RTO: 13.2 FL (ref 11.5–15)
PLATELET # BLD: 80 E9/L (ref 130–450)
PLATELET CONFIRMATION: NORMAL
PLATELET CONFIRMATION: NORMAL
PMV BLD AUTO: 12.8 FL (ref 7–12)
POTASSIUM REFLEX MAGNESIUM: 3.4 MMOL/L (ref 3.5–5)
RBC # BLD: 4.34 E12/L (ref 3.8–5.8)
SODIUM BLD-SCNC: 138 MMOL/L (ref 132–146)
WBC # BLD: 1.5 E9/L (ref 4.5–11.5)

## 2021-06-19 PROCEDURE — 99232 SBSQ HOSP IP/OBS MODERATE 35: CPT | Performed by: SURGERY

## 2021-06-19 PROCEDURE — 36415 COLL VENOUS BLD VENIPUNCTURE: CPT

## 2021-06-19 PROCEDURE — 80048 BASIC METABOLIC PNL TOTAL CA: CPT

## 2021-06-19 PROCEDURE — 81291 MTHFR GENE: CPT

## 2021-06-19 PROCEDURE — 83735 ASSAY OF MAGNESIUM: CPT

## 2021-06-19 PROCEDURE — 85027 COMPLETE CBC AUTOMATED: CPT

## 2021-06-19 PROCEDURE — 81400 MOPATH PROCEDURE LEVEL 1: CPT

## 2021-06-19 PROCEDURE — 6370000000 HC RX 637 (ALT 250 FOR IP): Performed by: FAMILY MEDICINE

## 2021-06-19 PROCEDURE — 6360000002 HC RX W HCPCS: Performed by: INTERNAL MEDICINE

## 2021-06-19 PROCEDURE — 81240 F2 GENE: CPT

## 2021-06-19 PROCEDURE — 81241 F5 GENE: CPT

## 2021-06-19 RX ORDER — CALCIUM CARBONATE 200(500)MG
500 TABLET,CHEWABLE ORAL 3 TIMES DAILY PRN
Status: DISCONTINUED | OUTPATIENT
Start: 2021-06-19 | End: 2021-06-19 | Stop reason: HOSPADM

## 2021-06-19 RX ADMIN — CALCIUM CARBONATE (ANTACID) CHEW TAB 500 MG 500 MG: 500 CHEW TAB at 15:47

## 2021-06-19 RX ADMIN — HEPARIN SODIUM 18 UNITS/KG/HR: 10000 INJECTION, SOLUTION INTRAVENOUS at 04:18

## 2021-06-19 ASSESSMENT — PAIN DESCRIPTION - ORIENTATION: ORIENTATION: RIGHT;LOWER

## 2021-06-19 ASSESSMENT — PAIN DESCRIPTION - PAIN TYPE: TYPE: ACUTE PAIN

## 2021-06-19 ASSESSMENT — PAIN SCALES - GENERAL
PAINLEVEL_OUTOF10: 1
PAINLEVEL_OUTOF10: 0

## 2021-06-19 ASSESSMENT — PAIN DESCRIPTION - LOCATION: LOCATION: LEG

## 2021-06-19 NOTE — ED PROVIDER NOTES
HPI:  6/19/21,   Time: 5:44 AM EDT         Alma Rosa Garcia is a 52 y.o. male presenting to the ED for RT LEG DVT, beginning several days ago. The complaint has been persistent, moderate in severity, and worsened by walking. Patient was sent in by Dr. Gracy Paulson after having outpatient ultrasounds right leg complaining of right leg pain and swelling for several days. Found to have a DVT extension up into the deep femoral vein. Patient is currently on Eliquis for history of DVTs. States he been taking Eliquis for 5 years had DVT of the left leg had embolectomy and vein stripping. States he only missed 1 dose of his anticoagulation last week. Does report low-grade fever. He has no chest pain or shortness of breath. Patient that he does suffer from lupus. ROS:   Pertinent positives and negatives are stated within HPI, all other systems reviewed and are negative.  --------------------------------------------- PAST HISTORY ---------------------------------------------  Past Medical History:  has a past medical history of Dog bite, H/O blood clots, Lupus ([facility] [address] and RA (rheumatoid arthritis) ([facility] Past Surgical History:  has a past surgical history that includes Appendectomy; Cholecystectomy; Mastoid surgery; [place] tooth extraction; other surgical history (4/15/16); and Knee cartilage surgery (Left, 06/2017). Social History:  reports that he quit smoking about 26 years ago. He smoked 0.50 packs per day. His smokeless tobacco use includes chew. He reports current alcohol use of about 6.0 standard drinks of alcohol per week. He reports that he does not use drugs. Family History: family history includes Cancer in his maternal grandfather. The patients home medications have been reviewed. Allergies: Patient has no known allergies.     -------------------------------------------------- RESULTS -------------------------------------------------  All laboratory and radiology results have been personally toxic in NAD  Head: NC/AT  Eyes: PERRL, EOMI  Mouth: Oropharynx clear, handling secretions, no trismus  Neck: Supple, full ROM, no meningeal signs  Pulmonary: Lungs clear to auscultation bilaterally, no wheezes, rales, or rhonchi. Not in respiratory distress  Cardiovascular:  Regular rate and rhythm, no murmurs, gallops, or rubs. 2+ distal pulses  Abdomen: Soft, non tender, non distended,   Extremities: Moves all extremities x 4. Warm and well perfused, right leg is grossly swollen is cool to the touch. Patient has no palpable pulses in the right foot however he does have Doppler pulses dorsalis pedis posterior tibial.  Skin: warm and dry without rash  Neurologic: GCS 15, no focal deficits. Psych: Normal Affect      ------------------------------ ED COURSE/MEDICAL DECISION MAKING----------------------  Medications   heparin (porcine) injection 8,780 Units (8,780 Units Intravenous Given 6/18/21 1234)         Medical Decision Making:    Consultation was made with vascular surgery recommends starting patient on heparin since he is treatment failure with apixaban. Recommend transfer to [facility] for further vascular studies. Consultation was made with patient's PCP who accepted admission. Counseling: The emergency provider has spoken with the patient and discussed todays results, in addition to providing specific details for the plan of care and counseling regarding the diagnosis and prognosis. Questions are answered at this time and they are agreeable with the plan.      --------------------------------- IMPRESSION AND DISPOSITION ---------------------------------    IMPRESSION  1.  Acute deep vein thrombosis (DVT) of femoral vein of right lower extremity ([facility] [address]        DISPOSITION  Disposition: Transfer to [facility]  Patient condition is stable                Julianne Milton DO  06/19/21 0056

## 2021-06-19 NOTE — PLAN OF CARE
Problem: Venous Thromboembolism:  Goal: Absence of signs or symptoms of impaired coagulation  Description: Absence of signs or symptoms of impaired coagulation  Outcome: Completed

## 2021-06-19 NOTE — PROGRESS NOTES
Vascular Surgery Progress Note    Pt is being seen in f/u today regarding DVT    Subjective:  Syd Bennett is a 52 y.o. male upon further questioning. This patient was only taking Eliquis 5 mg 1 time a day for several years. During that timeframe in the last month he is missed several doses therefore he has not been anticoagulated during that time. He now presents with a DVT. Overall he is doing well he to me the swelling is gone down significantly. He denies any chest pain shortness of breath or discomfort he denies any motor or sensation loss    Current Medications:    heparin (PORCINE) Infusion 18 Units/kg/hr (06/19/21 9502)    sodium chloride        heparin (porcine), heparin (porcine), sodium chloride, acetaminophen **OR** acetaminophen, ondansetron **OR** ondansetron, polyethylene glycol, sodium chloride flush, HYDROcodone-acetaminophen    heparin (porcine)  80 Units/kg Intravenous Once    sodium chloride flush  5-40 mL Intravenous 2 times per day        PHYSICAL EXAM:    /77   Pulse 102   Temp 98.4 °F (36.9 °C) (Oral)   Resp 16   Ht 6' 3\" (1.905 m)   Wt 242 lb (109.8 kg)   SpO2 95%   BMI 30.25 kg/m²     Intake/Output Summary (Last 24 hours) at 6/19/2021 1205  Last data filed at 6/19/2021 5801  Gross per 24 hour   Intake 1041 ml   Output 650 ml   Net 391 ml          General: Alert and oriented answers questions appropriately no acute distress  Skin: Warm and dry no change in turgor no jaundice no scleral icterus. Bilateral lower extremities demonstrate venous stasis disease and some varicose veins. HEENT: Normocephalic atraumatic trach is midline no jugular venous distention  CVS: Currently regular rate and rhythm  Resp: No labored breathing no audible wheezing good diaphragmatic excursion  Abd: Soft nontender no rebound or guarding. Positive bowel sounds  Extremities: Motor and sensation are intact the upper and lower extremities. Bilateral lower extremity leg swelling is noted. The right lower extremity is significantly improved from yesterday's exam.  Palpable DP and PT pulses  Neuro: Grossly intact bilateral any gross cranial nerve deficit    LABS:    Lab Results   Component Value Date    WBC 1.5 (L) 06/19/2021    HGB 13.4 06/19/2021    HCT 40.3 06/19/2021    PLT 80 (L) 06/19/2021    PROTIME 11.5 06/18/2021    INR 1.1 06/18/2021    APTT 77.3 (H) 06/18/2021    K 3.4 (L) 06/19/2021    BUN 6 06/19/2021    CREATININE 0.8 06/19/2021       RADIOLOGY:  No orders to display       ASSESSMENT/PLAN:   · #1 DVT. At this point he is missed doses of his Eliquis. He is also only on 5 mg daily. Hematology oncology has been consulted. At this point I do not think this is a failure of conservative therapy and he can be maintained on oral anticoagulation and no plans for filter. We discussed the filter option yesterday for failure of anticoagulation however based on his recent discussion with me again today he is only taking 5 mg daily. He does not want a proceed with a filter if all possible. I think he could probably be maintained on oral anticoagulation will await hematology oncology's input. At this time no vascular intervention.   If they are okay with oral anticoagulation again he can be discharged from our standpoint and follow-up with my partner        Electronically signed by Beatriz Lim MD on 6/19/2021 at 12:05 PM

## 2021-06-19 NOTE — DISCHARGE SUMMARY
Admitted less than 48 hours for DVT left lower extremity  Eliquis 10 mg p.o. twice daily x7 days followed by 5 mg p.o. twice daily indefinitely  Follow-up with PCP within 1 week  Follow-up with oncology as recommended by them  Okay for discharge

## 2021-06-19 NOTE — ED NOTES
[facility] preparing Pt for transport to [address] [place] [address] St; Pt in no apparent distress     Enriqueta Amezquita RN  06/18/21 8647

## 2021-06-19 NOTE — ED NOTES
Updated Pt report given to [address] RN for [address] [place] [address], RN  06/18/21 6047

## 2021-06-19 NOTE — PROGRESS NOTES
25,000 units in dextrose 5% 250 mL (premix) infusion, 5-30 Units/kg/hr, Intravenous, Continuous, Bart Zayas MD, Last Rate: 19.8 mL/hr at 06/19/21 0418, 18 Units/kg/hr at 06/19/21 0418    0.9 % sodium chloride infusion, 25 mL, Intravenous, PRN, Bart Zayas MD    acetaminophen (TYLENOL) tablet 650 mg, 650 mg, Oral, Q6H PRN **OR** acetaminophen (TYLENOL) suppository 650 mg, 650 mg, Rectal, Q6H PRN, Bart Zayas MD    ondansetron (ZOFRAN-ODT) disintegrating tablet 4 mg, 4 mg, Oral, Q8H PRN **OR** ondansetron (ZOFRAN) injection 4 mg, 4 mg, Intravenous, Q6H PRN, Bart Zayas MD    polyethylene glycol [facility]) packet 17 g, 17 g, Oral, Daily PRN, Bart Zayas MD    sodium chloride flush 0.9 % injection 5-40 mL, 5-40 mL, Intravenous, 2 times per day, Bart Zayas MD    sodium chloride flush 0.9 % injection 5-40 mL, 5-40 mL, Intravenous, PRN, Bart Zayas MD    HYDROcodone-acetaminophen [facility])  MG per tablet 1 tablet, 1 tablet, Oral, Q4H PRN, Bart Zayas MD    Assessment:    Principal Problem:    Deep vein thrombosis (DVT) of lower extremity ([address]  Resolved Problems:    * No resolved hospital problems. *  Patient is a 46-year-old man whom I have seen in the office earlier this year for for history of chronic benign leukopenia and thrombocytopenia associated with history of from with arthritis, lupus and Sjogren's syndrome. History of DVT involving the right lower extremity many years ago. He said he has been taking Eliquis 5 mg p.o. daily since then. He was diagnosed with recurrent DVT of right lower extremity requiring hospitalization. He is currently on IV heparin. Plan:  I told the patient that Eliquis has a short half life and it needs to be given twice a day and not only once daily. Patient can be discharged home on the correct dose of Eliquis.   He can start taking Eliquis 10 mg p.o. twice daily for 1 week then 5 mg p.o. twice daily as long as tolerated. Patient has mild thrombocytopenia but he has no history of serious or recurrent bleeding in the past.  He can continue anticoagulation as long as platelet count is above 50. Chronic leukopenia is an immune mediated. This is a quantitative and not qualitative white blood cell issue. Risk of neutropenic infections is relatively small. Follow-up with my office in couple months after discharge.       Electronically signed by Juany Marshall MD on 6/19/2021 at 2:01 PM

## 2021-06-19 NOTE — H&P
[address] [facility]  History and Physical      CHIEF COMPLAINT:  Leg swelling       Patient of Shea Arthur MD presents with:  Deep vein thrombosis (DVT) of lower extremity ([facility] [address]    History of Present Illness:   Patient is a 28-year-old male with a past medical history of lupus, RA, surgical nurse, and DVT. Patient presented to [facility] ED with complaints of right lower extremity edema. Patient stated the edema began on Monday and progressively worsened throughout the week. Patient is a  that walks approximately 10 to 12 miles per day. Patient is on Eliquis patient for history of DVT he did admit that he missed 3 doses approximately 2 weeks ago. Patient was diagnosed with right DVT and no evidence of a PE. Patient was transferred to [facility]. Once patient was at [facility] Dr. Consuelo Comer requested patient to be transferred to [facility]. Patient was started on a heparin drip. REVIEW OF SYSTEMS:  Pertinent negatives are above in HPI. 10 point ROS otherwise negative.       Past Medical History:   Diagnosis Date    Dog bite 08/24/2016    rabies vaccinations s/p dog bite    H/O blood clots     Lupus (HCC)     RA (rheumatoid arthritis) ([facility] [address]          Past Surgical History:   Procedure Laterality Date    APPENDECTOMY      CHOLECYSTECTOMY      KNEE CARTILAGE SURGERY Left 06/2017    MASTOID SURGERY      OTHER SURGICAL HISTORY  4/15/16    radiofrequency ablation left greater saphaneous vein and stab phlebectomies    WISDOM TOOTH EXTRACTION         Medications Prior to Admission:    Medications Prior to Admission: BENLYSTA 200 MG/ML SOAJ,   ELIQUIS 5 MG TABS tablet, TAKE 1 TABLET BY MOUTH 2 TIMES DAILY (Patient taking differently: daily)    Note that the patient's home medications were reviewed and the above list is accurate to the best of my knowledge at the time of the exam.    Allergies:    Patient has no known allergies. Social History:    reports that he quit smoking about 26 years ago. He smoked 0.50 packs per day. His smokeless tobacco use includes chew. He reports current alcohol use of about 6.0 standard drinks of alcohol per week. He reports that he does not use drugs. Family History:   family history includes Cancer in his maternal grandfather. PHYSICAL EXAM:    Vitals:  BP (!) 146/94   Pulse 92   Temp 98.4 °F (36.9 °C) (Oral)   Resp 16   Ht 6' 3\" (1.905 m)   Wt 242 lb (109.8 kg)   SpO2 98%   BMI 30.25 kg/m²       General appearance: NAD, conversant  Eyes: Sclerae anicteric, PERRLA  HEENT: AT/NC, MMM  Neck: FROM, supple, no thyromegaly  Lymph: No cervical / supraclavicular lymphadenopathy  Lungs: Clear to auscultation, WOB normal  CV: RRR, no MRGs, right lower extremity edema  Abdomen: Soft, non-tender; no masses or HSM, +BS  Extremities: FROM without synovitis. No clubbing or cyanosis of the hands. Right lower extremity edema. Skin: no rash, induration, lesions, or ulcers  Psych: Calm and cooperative. Normal judgement and insight. Normal mood and affect. Neuro: Alert and interactive, face symmetric, speech fluent. LABS:  All labs reviewed.   Of note:  CBC with Differential:    Lab Results   Component Value Date    WBC 1.5 06/19/2021    RBC 4.34 06/19/2021    HGB 13.4 06/19/2021    HCT 40.3 06/19/2021    PLT 80 06/19/2021    MCV 92.9 06/19/2021    MCH 30.9 06/19/2021    MCHC 33.3 06/19/2021    RDW 13.2 06/19/2021    LYMPHOPCT 45 04/15/2016    MONOPCT 17 04/15/2016    BASOPCT 0 04/15/2016    MONOSABS 0.36 04/15/2016    LYMPHSABS 0.91 04/15/2016    EOSABS 0.04 04/15/2016    BASOSABS 0.01 04/15/2016     Hemoglobin/Hematocrit:    Lab Results   Component Value Date    HGB 13.4 06/19/2021    HCT 40.3 06/19/2021         Telemetry:  I've personally reviewed the patient's telemetry:  ST    ASSESSMENT/PLAN:  Principal Problem:    Deep vein thrombosis (DVT) of lower extremity ([address]  Resolved

## 2021-06-19 NOTE — PROGRESS NOTES
Dr Kerri Rothman called, aware patient in 1748R, to continue heparin drip, elevate legs, to see tomorrow.  [facility] hematology to see

## 2021-06-24 LAB — THROMBOPHILIA DNA ASSAY: NORMAL

## 2021-09-10 ENCOUNTER — HOSPITAL ENCOUNTER (OUTPATIENT)
Dept: MRI IMAGING | Age: 51
Discharge: HOME | End: 2021-09-12
Payer: COMMERCIAL

## 2021-09-10 DIAGNOSIS — I11.9 BENIGN HYPERTENSIVE HEART DISEASE WITHOUT HF (HEART FAILURE): ICD-10-CM

## 2021-09-10 LAB
LV EF: 35 %
LVEF MODALITY: NORMAL

## 2021-09-10 PROCEDURE — A9579 GAD-BASE MR CONTRAST NOS,1ML: HCPCS | Performed by: RADIOLOGY

## 2021-09-10 PROCEDURE — 6360000004 HC RX CONTRAST MEDICATION: Performed by: RADIOLOGY

## 2021-09-10 PROCEDURE — 75561 CARDIAC MRI FOR MORPH W/DYE: CPT

## 2021-09-10 PROCEDURE — 75561 CARDIAC MRI FOR MORPH W/DYE: CPT | Performed by: INTERNAL MEDICINE

## 2021-09-10 RX ADMIN — GADOTERIDOL 40 ML: 279.3 INJECTION, SOLUTION INTRAVENOUS at 09:13

## 2021-10-01 ENCOUNTER — APPOINTMENT (OUTPATIENT)
Dept: ULTRASOUND IMAGING | Age: 51
End: 2021-10-01
Payer: COMMERCIAL

## 2021-10-01 ENCOUNTER — APPOINTMENT (OUTPATIENT)
Dept: CT IMAGING | Age: 51
End: 2021-10-01
Payer: COMMERCIAL

## 2021-10-01 ENCOUNTER — HOSPITAL ENCOUNTER (OUTPATIENT)
Age: 51
Setting detail: OBSERVATION
Discharge: HOME | End: 2021-10-02
Attending: EMERGENCY MEDICINE | Admitting: INTERNAL MEDICINE
Payer: COMMERCIAL

## 2021-10-01 DIAGNOSIS — I82.5Y1 CHRONIC DEEP VEIN THROMBOSIS (DVT) OF PROXIMAL VEIN OF RIGHT LOWER EXTREMITY (HCC): ICD-10-CM

## 2021-10-01 DIAGNOSIS — L03.115 CELLULITIS OF RIGHT LEG: Primary | ICD-10-CM

## 2021-10-01 PROBLEM — I82.401: Status: ACTIVE | Noted: 2021-10-01

## 2021-10-01 LAB
ANION GAP SERPL CALCULATED.3IONS-SCNC: 8 MMOL/L (ref 7–16)
APTT: 38 SEC (ref 24.5–35.1)
BASOPHILS ABSOLUTE: 0.01 E9/L (ref 0–0.2)
BASOPHILS RELATIVE PERCENT: 0.6 % (ref 0–2)
BUN BLDV-MCNC: 10 MG/DL (ref 6–20)
CALCIUM SERPL-MCNC: 8.4 MG/DL (ref 8.6–10.2)
CHLORIDE BLD-SCNC: 98 MMOL/L (ref 98–107)
CO2: 28 MMOL/L (ref 22–29)
CREAT SERPL-MCNC: 0.8 MG/DL (ref 0.7–1.2)
EOSINOPHILS ABSOLUTE: 0.01 E9/L (ref 0.05–0.5)
EOSINOPHILS RELATIVE PERCENT: 0.6 % (ref 0–6)
GFR AFRICAN AMERICAN: >60
GFR NON-AFRICAN AMERICAN: >60 ML/MIN/1.73
GLUCOSE BLD-MCNC: 88 MG/DL (ref 74–99)
HCT VFR BLD CALC: 41.9 % (ref 37–54)
HEMOGLOBIN: 14.1 G/DL (ref 12.5–16.5)
IMMATURE GRANULOCYTES #: 0.01 E9/L
IMMATURE GRANULOCYTES %: 0.6 % (ref 0–5)
INR BLD: 1.8
LACTIC ACID: 1 MMOL/L (ref 0.5–2.2)
LYMPHOCYTES ABSOLUTE: 0.28 E9/L (ref 1.5–4)
LYMPHOCYTES RELATIVE PERCENT: 17.9 % (ref 20–42)
MCH RBC QN AUTO: 29.2 PG (ref 26–35)
MCHC RBC AUTO-ENTMCNC: 33.7 % (ref 32–34.5)
MCV RBC AUTO: 86.7 FL (ref 80–99.9)
MONOCYTES ABSOLUTE: 0.25 E9/L (ref 0.1–0.95)
MONOCYTES RELATIVE PERCENT: 16 % (ref 2–12)
NEUTROPHILS ABSOLUTE: 1 E9/L (ref 1.8–7.3)
NEUTROPHILS RELATIVE PERCENT: 64.3 % (ref 43–80)
PDW BLD-RTO: 14.6 FL (ref 11.5–15)
PLATELET # BLD: 130 E9/L (ref 130–450)
PMV BLD AUTO: 11 FL (ref 7–12)
POTASSIUM SERPL-SCNC: 3.6 MMOL/L (ref 3.5–5)
PROTHROMBIN TIME: 19.7 SEC (ref 9.3–12.4)
RBC # BLD: 4.83 E12/L (ref 3.8–5.8)
SODIUM BLD-SCNC: 134 MMOL/L (ref 132–146)
TOTAL CK: 104 U/L (ref 20–200)
WBC # BLD: 1.6 E9/L (ref 4.5–11.5)

## 2021-10-01 PROCEDURE — 99283 EMERGENCY DEPT VISIT LOW MDM: CPT

## 2021-10-01 PROCEDURE — 80048 BASIC METABOLIC PNL TOTAL CA: CPT

## 2021-10-01 PROCEDURE — 6360000002 HC RX W HCPCS: Performed by: PHYSICIAN ASSISTANT

## 2021-10-01 PROCEDURE — 73700 CT LOWER EXTREMITY W/O DYE: CPT

## 2021-10-01 PROCEDURE — 36415 COLL VENOUS BLD VENIPUNCTURE: CPT

## 2021-10-01 PROCEDURE — 6360000002 HC RX W HCPCS

## 2021-10-01 PROCEDURE — 85610 PROTHROMBIN TIME: CPT

## 2021-10-01 PROCEDURE — 85730 THROMBOPLASTIN TIME PARTIAL: CPT

## 2021-10-01 PROCEDURE — 96374 THER/PROPH/DIAG INJ IV PUSH: CPT

## 2021-10-01 PROCEDURE — G0378 HOSPITAL OBSERVATION PER HR: HCPCS

## 2021-10-01 PROCEDURE — 83605 ASSAY OF LACTIC ACID: CPT

## 2021-10-01 PROCEDURE — 85025 COMPLETE CBC W/AUTO DIFF WBC: CPT

## 2021-10-01 PROCEDURE — 93971 EXTREMITY STUDY: CPT

## 2021-10-01 PROCEDURE — 82550 ASSAY OF CK (CPK): CPT

## 2021-10-01 RX ORDER — CEFAZOLIN SODIUM 1 G/3ML
INJECTION, POWDER, FOR SOLUTION INTRAMUSCULAR; INTRAVENOUS
Status: COMPLETED
Start: 2021-10-01 | End: 2021-10-01

## 2021-10-01 RX ADMIN — Medication 2000 MG: at 15:05

## 2021-10-01 RX ADMIN — CEFAZOLIN SODIUM 1000 MG: 1 INJECTION, POWDER, FOR SOLUTION INTRAMUSCULAR; INTRAVENOUS at 23:55

## 2021-10-01 ASSESSMENT — PAIN DESCRIPTION - DESCRIPTORS: DESCRIPTORS: ACHING;SHOOTING

## 2021-10-01 ASSESSMENT — PAIN DESCRIPTION - LOCATION: LOCATION: LEG

## 2021-10-01 ASSESSMENT — PAIN SCALES - GENERAL: PAINLEVEL_OUTOF10: 9

## 2021-10-01 ASSESSMENT — PAIN DESCRIPTION - ORIENTATION: ORIENTATION: RIGHT

## 2021-10-01 ASSESSMENT — PAIN DESCRIPTION - PAIN TYPE: TYPE: ACUTE PAIN

## 2021-10-01 NOTE — ED PROVIDER NOTES
ATTENDING PROVIDER ATTESTATION:     Brigitte Hardwick presented to the emergency department for evaluation of Leg Swelling (Pain and swelling right leg for 2 days getting worse)    I have reviewed and discussed the case, including pertinent history (medical, surgical, family and social) and exam findings with the Midlevel and the Nurse assigned to Brigitte Hardwick. I have personally performed and/or participated in the history, exam, medical decision making, and procedures and agree with all pertinent clinical information. Patient presents w/ acute right calf pain after slipping on truck yesterday. Today heard pop while walking. Pain gradual onset, progressively worsening, moderate in severity. Associated redness, warmth, swelling. Hx of DVT, previously on eliquis, recently switched to xarelto for once daily dosing to improve compliance. R calf is ttp, erythematous, warm, mildly indurated. No crepitus/fluctuance/drainage. 2+ DP/PT. 5/5 DF/EHL/PF. Negative Yeh test. ?partial muscle tear w/ hematoma. Compartments soft and compressible although the leg is swollen. No visible ecchymosis. Duplex shows chronic RLE DVT. Discussed findings w/ Dr Thiago Garcia (vascular surgery) and he recommends CT without contrast to evaluate for hematoma. He also agrees w/ plan for transfer to [facility] for observation to reassess compartments and leg exam after IV abx and supportive care overnight. I have reviewed my findings and recommendations with Brigitte Hardwick and members of family present at the time of disposition. My findings/plan: The primary encounter diagnosis was Cellulitis of right leg. A diagnosis of Chronic deep vein thrombosis (DVT) of proximal vein of right lower extremity (HCC) was also pertinent to this visit.     MD Pranav Sloan MD  10/02/21 1334

## 2021-10-01 NOTE — ED PROVIDER NOTES
Seen with physician:      HPI:  10/1/21, Time: 12:48 PM EDT         Gilberto Lindsey is a 52 y.o. male presenting to the ED for right lower leg swelling and pain. He reports that he slipped on the rail of his truck yesterday at work and went home last evening and put local heat on his right lower leg and he awoke this morning with worsening swelling and pain to his right lower leg with worsening pain and redness. He reports he was at work and tried to walk on a slight incline and his right lower leg popped to his right posterior ankle and he then was unable to walk further and reported to his job that he could not continue and comes to ER for evaluation. He does have a history of DVT to his right lower leg onset last year. Was previously on Eliquis and recently switched to Xarelto about 6 weeks ago. He has been taking the Xarelto and has not missed any doses. He reports that the pain is severe to his right lower posterior heel and lower leg. The complaint has been constant, moderate to severe in severity, and worsened by ambulation. He has had no known fever or chills. He reports a history of previous blood clots to his right lower leg in the past after a previous crushing injury as well as history of lupus and rheumatoid arthritis. He also is pending surgery for a valvular/heart issue in [facility], thought to be linked to have COVID in the recent past.    He denies any current chest pain or SOB. Review of Systems:   A complete review of systems was performed and pertinent positives and negatives are stated within HPI, all other systems reviewed and are negative.    --------------------------------------------- PAST HISTORY ---------------------------------------------  Past Medical History:  has a past medical history of Dog bite, H/O blood clots, Lupus ([facility] [address] and RA (rheumatoid arthritis) ([facility] Past Surgical History:  has a past surgical history that includes Appendectomy;  Cholecystectomy; Mastoid surgery; [place] tooth extraction; other surgical history (4/15/16); and Knee cartilage surgery (Left, 06/2017). Social History:  reports that he quit smoking about 26 years ago. He smoked 0.50 packs per day. His smokeless tobacco use includes chew. He reports current alcohol use of about 6.0 standard drinks of alcohol per week. He reports that he does not use drugs. Family History: family history includes Cancer in his maternal grandfather. The patients home medications have been reviewed. Allergies: Patient has no known allergies.     -------------------------------------------------- RESULTS -------------------------------------------------  All laboratory and radiology results have been personally reviewed by myself   LABS:  Results for orders placed or performed during the hospital encounter of 10/01/21   CBC auto differential   Result Value Ref Range    WBC 1.6 (L) 4.5 - 11.5 E9/L    RBC 4.83 3.80 - 5.80 E12/L    Hemoglobin 14.1 12.5 - 16.5 g/dL    Hematocrit 41.9 37.0 - 54.0 %    MCV 86.7 80.0 - 99.9 fL    MCH 29.2 26.0 - 35.0 pg    MCHC 33.7 32.0 - 34.5 %    RDW 14.6 11.5 - 15.0 fL    Platelets 865 880 - 392 E9/L    MPV 11.0 7.0 - 12.0 fL    Neutrophils % 64.3 43.0 - 80.0 %    Immature Granulocytes % 0.6 0.0 - 5.0 %    Lymphocytes % 17.9 (L) 20.0 - 42.0 %    Monocytes % 16.0 (H) 2.0 - 12.0 %    Eosinophils % 0.6 0.0 - 6.0 %    Basophils % 0.6 0.0 - 2.0 %    Neutrophils Absolute 1.00 (L) 1.80 - 7.30 E9/L    Immature Granulocytes # 0.01 E9/L    Lymphocytes Absolute 0.28 (L) 1.50 - 4.00 E9/L    Monocytes Absolute 0.25 0.10 - 0.95 E9/L    Eosinophils Absolute 0.01 (L) 0.05 - 0.50 E9/L    Basophils Absolute 0.01 0.00 - 0.20 U4/I   Basic metabolic panel   Result Value Ref Range    Sodium 134 132 - 146 mmol/L    Potassium 3.6 3.5 - 5.0 mmol/L    Chloride 98 98 - 107 mmol/L    CO2 28 22 - 29 mmol/L    Anion Gap 8 7 - 16 mmol/L    Glucose 88 74 - 99 mg/dL    BUN 10 6 - 20 mg/dL    CREATININE 0.8 0.7 - 1.2 mg/dL    GFR Non-African American >60 >=60 mL/min/1.73    GFR African American >60     Calcium 8.4 (L) 8.6 - 10.2 mg/dL   Protime-INR   Result Value Ref Range    Protime 19.7 (H) 9.3 - 12.4 sec    INR 1.8    APTT   Result Value Ref Range    aPTT 38.0 (H) 24.5 - 35.1 sec   CK   Result Value Ref Range    Total  20 - 200 U/L   Lactic acid, plasma   Result Value Ref Range    Lactic Acid 1.0 0.5 - 2.2 mmol/L   Comprehensive Metabolic Panel w/ Reflex to MG   Result Value Ref Range    Sodium 138 132 - 146 mmol/L    Potassium reflex Magnesium 3.8 3.5 - 5.0 mmol/L    Chloride 102 98 - 107 mmol/L    CO2 32 (H) 22 - 29 mmol/L    Anion Gap 4 (L) 7 - 16 mmol/L    Glucose 89 74 - 99 mg/dL    BUN 8 6 - 20 mg/dL    CREATININE 0.8 0.7 - 1.2 mg/dL    GFR Non-African American >60 >=60 mL/min/1.73    GFR African American >60     Calcium 8.3 (L) 8.6 - 10.2 mg/dL    Total Protein 6.8 6.4 - 8.3 g/dL    Albumin 2.8 (L) 3.5 - 5.2 g/dL    Total Bilirubin 0.4 0.0 - 1.2 mg/dL    Alkaline Phosphatase 75 40 - 129 U/L    ALT 15 0 - 40 U/L    AST 21 0 - 39 U/L   CBC auto differential   Result Value Ref Range    WBC 1.0 (L) 4.5 - 11.5 E9/L    RBC 4.82 3.80 - 5.80 E12/L    Hemoglobin 13.8 12.5 - 16.5 g/dL    Hematocrit 42.0 37.0 - 54.0 %    MCV 87.1 80.0 - 99.9 fL    MCH 28.6 26.0 - 35.0 pg    MCHC 32.9 32.0 - 34.5 %    RDW 14.6 11.5 - 15.0 fL    Platelets 991 (L) 028 - 450 E9/L    MPV 11.8 7.0 - 12.0 fL       RADIOLOGY:  Interpreted by Radiologist.  CT TIBIA FIBULA RIGHT WO CONTRAST   Final Result   1. Subcutaneous edema in the right leg with perifascial edema along the   medial gastrocnemius muscle which may signify fasciitis. No soft tissue gas   is seen. 2. No CT evidence of osteomyelitis. US DUP LOWER EXTREMITY RIGHT ELIO   Final Result   Peripheral, nonocclusive thrombus from the mid right superficial femoral vein   into the calf with central recanalization likely represents chronic deep   venous thrombosis. ------------------------- NURSING NOTES AND VITALS REVIEWED ---------------------------   The nursing notes within the ED encounter and vital signs as below have been reviewed. BP (!) 143/92   Pulse 91   Temp 98.3 °F (36.8 °C) (Oral)   Resp 18   Ht 6' 3\" (1.905 m)   Wt 225 lb (102.1 kg)   SpO2 96%   BMI 28.12 kg/m²   Oxygen Saturation Interpretation: Normal      ---------------------------------------------------PHYSICAL EXAM--------------------------------------      Constitutional/General: Alert and oriented x3, stable appearing, non toxic in NAD  Head: Normocephalic and atraumatic  Eyes: PERRL, EOMI  Mouth: Oropharynx clear, mucosa moist.  Neck: Supple, full ROM  Pulmonary: Lungs clear to auscultation bilaterally, no wheezes or rhonchi. Not in respiratory distress  Cardiovascular:  Regular rate and rhythm, no obvious murmur or ectopy. . 2+ distal pulses  Abdomen: Soft, non tender, BS active. Extremities: Moves all extremities x 4. Marked local edema noted to RLE with marked erythema noted. Posterior right calf with marked edema as well as marked edema to medial and lateral  right ankle. Skin warm to touch. Skin with no open sores or fluid leakage noted. No signs of skin breakdown to interdigital area of toes of right foot. Local tenderness to right achilles posteriorly, achilles reflex appears intact, but tenderness to posterior calf appears superior to achilles region. Warm and well perfused. Skin markings done at area of erythema. Mid calf measures approximately 43cm and more proximal to this area at a little over 40cm.   Skin: warm and dry  Neurologic: GCS 15  Psych: Normal Affect      ------------------------------ ED COURSE/MEDICAL DECISION MAKING----------------------  Medications   sodium chloride flush 0.9 % injection 5-40 mL (has no administration in time range)   sodium chloride flush 0.9 % injection 5-40 mL (has no administration in time range)   0.9 % sodium chloride infusion (has no administration in time range)   ondansetron (ZOFRAN-ODT) disintegrating tablet 4 mg (has no administration in time range)     Or   ondansetron (ZOFRAN) injection 4 mg (has no administration in time range)   polyethylene glycol (GLYCOLAX) packet 17 g (has no administration in time range)   acetaminophen (TYLENOL) tablet 650 mg (has no administration in time range)     Or   acetaminophen (TYLENOL) suppository 650 mg (has no administration in time range)   rivaroxaban (XARELTO) tablet 20 mg (has no administration in time range)   influenza quadrivalent split vaccine (FLUZONE;FLUARIX;FLULAVAL;AFLURIA) injection 0.5 mL (has no administration in time range)   ceFAZolin (ANCEF) 2000 mg in sterile water 20 mL IV syringe (has no administration in time range)   ceFAZolin (ANCEF) 2000 mg in sterile water 20 mL IV syringe (2,000 mg IntraVENous Given 10/1/21 1505)   ceFAZolin (ANCEF) 1 g injection (1,000 mg  Given 10/1/21 2355)         ED COURSE:       Medical Decision Making:    Patient to ER, complaints of injury sustained at work 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 Inga Watts spoke with Dr. Angela Garnica who is covering for vascular surgery concerning Ultrasound findings and concerns with current swelling, need for admission for observation to monitor compartments of his lower leg. Dr. Angela Garnica agrees that patient should be admitted, but would like patient to be admitted to [facility].  He did request CT imaging of right lower leg be performed. Dr. Angela Garnica did call back after CT imaging reviewed and fasciitis was noted. He did not feel necrotizing fasciitis was a concern,  but still feels that patient needs to be admitted and observed to look for further swelling of compartments due to large amount of swelling and antibiotics still appropriate and vascular on board.     [facility] hospitalist group consulted and Dr. Kb Mclain spoke with my ER attending Dr. Kiya Dean who took over for Dr. Nguyen Shaw. He agrees with admission for observation to [address] [place] [address] with patient importance of admission as patient did not want to go to [facility] and had wanted initially to go to [facility], but advised of potential risks of loss of limb as well as potential sepsis as well as loss of life if not closely monitored and patient agreed with admission. Patient did have paperwork from Workers compensation, these were completed and sent with patient, unsure whether or not this will be placed under Workers compensation claim, but will send on with patient as unsure as to the underlying cause. No definite evidence at this time of achilles rupture, possible partial disruption vs strain?, patient most likely will consult with orthopedics once at [facility].        Counseling: The emergency provider has spoken with the patient and discussed todays results, in addition to providing specific details for the plan of care and counseling regarding the diagnosis and prognosis. Questions are answered at this time and they are agreeable with the plan.      --------------------------------- IMPRESSION AND DISPOSITION ---------------------------------    IMPRESSION  1. Cellulitis of right leg    2. Chronic deep vein thrombosis (DVT) of proximal vein of right lower extremity ([facility] [address]        DISPOSITION  Disposition: Transfer to [address] [place] [address] for Admission  Patient condition is stable      NOTE: This report was transcribed using voice recognition software.  Every effort was made to ensure accuracy; however, inadvertent computerized transcription errors may be present       Jorge Wahl PA-C  10/02/21 [id number]

## 2021-10-02 VITALS
WEIGHT: 225 LBS | RESPIRATION RATE: 16 BRPM | OXYGEN SATURATION: 97 % | DIASTOLIC BLOOD PRESSURE: 90 MMHG | HEART RATE: 103 BPM | SYSTOLIC BLOOD PRESSURE: 138 MMHG | HEIGHT: 75 IN | BODY MASS INDEX: 27.98 KG/M2 | TEMPERATURE: 98.8 F

## 2021-10-02 PROBLEM — M32.9 LUPUS (HCC): Status: ACTIVE | Noted: 2021-10-02

## 2021-10-02 PROBLEM — M66.0 RUPTURED BAKERS CYST: Status: ACTIVE | Noted: 2021-10-02

## 2021-10-02 LAB
ALBUMIN SERPL-MCNC: 2.8 G/DL (ref 3.5–5.2)
ALP BLD-CCNC: 75 U/L (ref 40–129)
ALT SERPL-CCNC: 15 U/L (ref 0–40)
ANION GAP SERPL CALCULATED.3IONS-SCNC: 4 MMOL/L (ref 7–16)
AST SERPL-CCNC: 21 U/L (ref 0–39)
BASOPHILS ABSOLUTE: 0.01 E9/L (ref 0–0.2)
BASOPHILS RELATIVE PERCENT: 1 % (ref 0–2)
BILIRUB SERPL-MCNC: 0.4 MG/DL (ref 0–1.2)
BUN BLDV-MCNC: 8 MG/DL (ref 6–20)
CALCIUM SERPL-MCNC: 8.3 MG/DL (ref 8.6–10.2)
CHLORIDE BLD-SCNC: 102 MMOL/L (ref 98–107)
CO2: 32 MMOL/L (ref 22–29)
CREAT SERPL-MCNC: 0.8 MG/DL (ref 0.7–1.2)
D DIMER: 391 NG/ML DDU
EOSINOPHILS ABSOLUTE: 0.02 E9/L (ref 0.05–0.5)
EOSINOPHILS RELATIVE PERCENT: 2 % (ref 0–6)
GFR AFRICAN AMERICAN: >60
GFR NON-AFRICAN AMERICAN: >60 ML/MIN/1.73
GLUCOSE BLD-MCNC: 89 MG/DL (ref 74–99)
HCT VFR BLD CALC: 42 % (ref 37–54)
HEMOGLOBIN: 13.8 G/DL (ref 12.5–16.5)
IMMATURE GRANULOCYTES #: 0.01 E9/L
IMMATURE GRANULOCYTES %: 1 % (ref 0–5)
LYMPHOCYTES ABSOLUTE: 0.28 E9/L (ref 1.5–4)
LYMPHOCYTES RELATIVE PERCENT: 27.7 % (ref 20–42)
MCH RBC QN AUTO: 28.6 PG (ref 26–35)
MCHC RBC AUTO-ENTMCNC: 32.9 % (ref 32–34.5)
MCV RBC AUTO: 87.1 FL (ref 80–99.9)
MONOCYTES ABSOLUTE: 0.22 E9/L (ref 0.1–0.95)
MONOCYTES RELATIVE PERCENT: 21.8 % (ref 2–12)
NEUTROPHILS ABSOLUTE: 0.47 E9/L (ref 1.8–7.3)
NEUTROPHILS RELATIVE PERCENT: 46.5 % (ref 43–80)
OVALOCYTES: ABNORMAL
PDW BLD-RTO: 14.6 FL (ref 11.5–15)
PLATELET # BLD: 129 E9/L (ref 130–450)
PMV BLD AUTO: 11.8 FL (ref 7–12)
POIKILOCYTES: ABNORMAL
POLYCHROMASIA: ABNORMAL
POTASSIUM REFLEX MAGNESIUM: 3.8 MMOL/L (ref 3.5–5)
RBC # BLD: 4.82 E12/L (ref 3.8–5.8)
SODIUM BLD-SCNC: 138 MMOL/L (ref 132–146)
TEAR DROP CELLS: ABNORMAL
TOTAL PROTEIN: 6.8 G/DL (ref 6.4–8.3)
WBC # BLD: 1 E9/L (ref 4.5–11.5)

## 2021-10-02 PROCEDURE — 80053 COMPREHEN METABOLIC PANEL: CPT

## 2021-10-02 PROCEDURE — 85025 COMPLETE CBC W/AUTO DIFF WBC: CPT

## 2021-10-02 PROCEDURE — G0378 HOSPITAL OBSERVATION PER HR: HCPCS

## 2021-10-02 PROCEDURE — 6370000000 HC RX 637 (ALT 250 FOR IP): Performed by: INTERNAL MEDICINE

## 2021-10-02 PROCEDURE — 2580000003 HC RX 258: Performed by: INTERNAL MEDICINE

## 2021-10-02 PROCEDURE — 36415 COLL VENOUS BLD VENIPUNCTURE: CPT

## 2021-10-02 PROCEDURE — 99213 OFFICE O/P EST LOW 20 MIN: CPT | Performed by: SURGERY

## 2021-10-02 PROCEDURE — 85378 FIBRIN DEGRADE SEMIQUANT: CPT

## 2021-10-02 RX ORDER — ACETAMINOPHEN 325 MG/1
650 TABLET ORAL EVERY 6 HOURS PRN
Status: DISCONTINUED | OUTPATIENT
Start: 2021-10-02 | End: 2021-10-02 | Stop reason: HOSPADM

## 2021-10-02 RX ORDER — SODIUM CHLORIDE 0.9 % (FLUSH) 0.9 %
5-40 SYRINGE (ML) INJECTION EVERY 12 HOURS SCHEDULED
Status: DISCONTINUED | OUTPATIENT
Start: 2021-10-02 | End: 2021-10-02 | Stop reason: HOSPADM

## 2021-10-02 RX ORDER — POLYETHYLENE GLYCOL 3350 17 G/17G
17 POWDER, FOR SOLUTION ORAL DAILY PRN
Status: DISCONTINUED | OUTPATIENT
Start: 2021-10-02 | End: 2021-10-02 | Stop reason: HOSPADM

## 2021-10-02 RX ORDER — ONDANSETRON 4 MG/1
4 TABLET, ORALLY DISINTEGRATING ORAL EVERY 8 HOURS PRN
Status: DISCONTINUED | OUTPATIENT
Start: 2021-10-02 | End: 2021-10-02 | Stop reason: HOSPADM

## 2021-10-02 RX ORDER — ACETAMINOPHEN 650 MG/1
650 SUPPOSITORY RECTAL EVERY 6 HOURS PRN
Status: DISCONTINUED | OUTPATIENT
Start: 2021-10-02 | End: 2021-10-02 | Stop reason: HOSPADM

## 2021-10-02 RX ORDER — ONDANSETRON 2 MG/ML
4 INJECTION INTRAMUSCULAR; INTRAVENOUS EVERY 6 HOURS PRN
Status: DISCONTINUED | OUTPATIENT
Start: 2021-10-02 | End: 2021-10-02 | Stop reason: HOSPADM

## 2021-10-02 RX ORDER — SODIUM CHLORIDE 9 MG/ML
25 INJECTION, SOLUTION INTRAVENOUS PRN
Status: DISCONTINUED | OUTPATIENT
Start: 2021-10-02 | End: 2021-10-02 | Stop reason: HOSPADM

## 2021-10-02 RX ORDER — SODIUM CHLORIDE 0.9 % (FLUSH) 0.9 %
5-40 SYRINGE (ML) INJECTION PRN
Status: DISCONTINUED | OUTPATIENT
Start: 2021-10-02 | End: 2021-10-02 | Stop reason: HOSPADM

## 2021-10-02 RX ADMIN — RIVAROXABAN 20 MG: 20 TABLET, FILM COATED ORAL at 10:32

## 2021-10-02 RX ADMIN — Medication 10 ML: at 10:33

## 2021-10-02 ASSESSMENT — PAIN DESCRIPTION - ORIENTATION: ORIENTATION: RIGHT

## 2021-10-02 ASSESSMENT — PAIN DESCRIPTION - PAIN TYPE: TYPE: ACUTE PAIN

## 2021-10-02 ASSESSMENT — PAIN SCALES - GENERAL
PAINLEVEL_OUTOF10: 4
PAINLEVEL_OUTOF10: 0

## 2021-10-02 ASSESSMENT — PAIN DESCRIPTION - DESCRIPTORS: DESCRIPTORS: SHARP

## 2021-10-02 ASSESSMENT — PAIN DESCRIPTION - LOCATION: LOCATION: LEG

## 2021-10-02 NOTE — CONSULTS
Department of Orthopedic Surgery  Resident Consult Note          Reason for Consult: Ruptured Baker's cyst    HISTORY OF PRESENT ILLNESS:       Patient is a 52 y.o. male currently admitted by medicine for suspected cellulitis and DVT, orthopedics was consulted because the CT scan mentioned a possible Baker's cyst.  Patient denies any pain behind the knee, fullness, or even complaints of noticing a Baker's cyst in the knee. He works as a  and said that his right leg had been bothering him prompting him to come in for evaluation, he was found to have a DVT. Denies numbness/tingling/paresthesias. Denies any other orthopedic complaints at this time.       Past Medical History:        Diagnosis Date    Dog bite 08/24/2016    rabies vaccinations s/p dog bite    H/O blood clots     Lupus (HCC)     RA (rheumatoid arthritis) ([facility] [address]      Past Surgical History:        Procedure Laterality Date    APPENDECTOMY      CHOLECYSTECTOMY      KNEE CARTILAGE SURGERY Left 06/2017    MASTOID SURGERY      OTHER SURGICAL HISTORY  4/15/16    radiofrequency ablation left greater saphaneous vein and stab phlebectomies    WISDOM TOOTH EXTRACTION       Current Medications:   Current Facility-Administered Medications: sodium chloride flush 0.9 % injection 5-40 mL, 5-40 mL, IntraVENous, 2 times per day  sodium chloride flush 0.9 % injection 5-40 mL, 5-40 mL, IntraVENous, PRN  0.9 % sodium chloride infusion, 25 mL, IntraVENous, PRN  ondansetron (ZOFRAN-ODT) disintegrating tablet 4 mg, 4 mg, Oral, Q8H PRN **OR** ondansetron (ZOFRAN) injection 4 mg, 4 mg, IntraVENous, Q6H PRN  polyethylene glycol (GLYCOLAX) packet 17 g, 17 g, Oral, Daily PRN  acetaminophen (TYLENOL) tablet 650 mg, 650 mg, Oral, Q6H PRN **OR** acetaminophen (TYLENOL) suppository 650 mg, 650 mg, Rectal, Q6H PRN  influenza quadrivalent split vaccine (FLUZONE;FLUARIX;FLULAVAL;AFLURIA) injection 0.5 mL, 0.5 mL, IntraMUSCular, Prior to discharge  ceFAZolin (ANCEF) 2000 mg in sterile water 20 mL IV syringe, 2,000 mg, IntraVENous, Q8H  rivaroxaban (XARELTO) tablet 20 mg, 20 mg, Oral, Daily  Allergies:  Patient has no known allergies. Social History:   TOBACCO:   reports that he quit smoking about 26 years ago. He smoked 0.50 packs per day. His smokeless tobacco use includes chew. ETOH:   reports current alcohol use of about 6.0 standard drinks of alcohol per week. DRUGS:   reports no history of drug use.   ACTIVITIES OF DAILY LIVING:    OCCUPATION:    Family History:       Problem Relation Age of Onset    Cancer Maternal Grandfather         throat       REVIEW OF SYSTEMS:  CONSTITUTIONAL:  negative for  fevers, chills  EYES:  negative for blurred vision, visual disturbance  HEENT:  negative for  hearing loss, voice change  RESPIRATORY:  negative for  dyspnea, wheezing  CARDIOVASCULAR:  negative for  chest pain, palpitations  GASTROINTESTINAL:  negative for nausea, vomiting  GENITOURINARY:  negative for frequency, urinary incontinence  HEMATOLOGIC/LYMPHATIC:  negative for bleeding and petechiae  MUSCULOSKELETAL:  positive for right leg and calf pain  NEUROLOGICAL:  negative for headaches, dizziness  BEHAVIOR/PSYCH:  negative for increased agitation and anxiety    PHYSICAL EXAM:    VITALS:  BP (!) 143/92   Pulse 91   Temp 98.3 °F (36.8 °C) (Oral)   Resp 18   Ht 6' 3\" (1.905 m)   Wt 225 lb (102.1 kg)   SpO2 96%   BMI 28.12 kg/m²   CONSTITUTIONAL:  awake, alert, cooperative, no apparent distress, and appears stated age  MUSCULOSKELETAL:  Right lower Extremity:  · Redness and swelling to the right lower leg  · Tenderness to palpation with calf squeeze  · Compartments soft and compressible  · Normal ankle range of motion, normal knee range of motion  · No fullness or cyst appreciated in the posterior knee  · No crepitus or pain with patellar motion  · No effusions  · Sensation and motor intact the right lower extremity    Secondary Exam:   · bilateralUE: No obvious signs of trauma. -TTP to fingers, hand, wrist, forearm, elbow, humerus, shoulder or clavicle. -- Patient able to flex/extend fingers, wrist, elbow and shoulder with active and passive ROM without pain, +2/4 Radial pulse, cap refill <3sec, +AIN/PIN/Radial/Ulnar/Median N, distal sensation grossly intact to C4-T1 dermatomes, compartments soft and compressible. · leftLE: No obvious signs of trauma. -TTP to foot, ankle, leg, knee, thigh, hip.-- Patient able to flex/extend toes, ankle, knee and hip with active and passive ROM without pain,+2/4 DP & PT pulses, cap refill <3sec, +5/5 PF/DF/EHL, distal sensation grossly intact to L4-S1 dermatomes, compartments soft and compressible.     · Pelvis: -TTP, -Log roll, -Heel strike     DATA:    CBC:   Lab Results   Component Value Date    WBC 1.6 10/01/2021    RBC 4.83 10/01/2021    HGB 14.1 10/01/2021    HCT 41.9 10/01/2021    MCV 86.7 10/01/2021    MCH 29.2 10/01/2021    MCHC 33.7 10/01/2021    RDW 14.6 10/01/2021     10/01/2021    MPV 11.0 10/01/2021     PT/INR:    Lab Results   Component Value Date    PROTIME 19.7 10/01/2021    INR 1.8 10/01/2021     CT of the right leg reviewed, no acute osseous findings    IMPRESSION:  · Cellulitis versus DVT    PLAN:  · Patient has no complaints of a Baker's cyst, if present, this would be a benign finding and would not warrant any orthopedic intervention at this time  · Orthopedics will sign off  · Patient states that he does see an orthopedic doctor at [facility], I recommended that if once his DVT and cellulitis are treated and he is still having knee pain, he can follow-up with his doctor at [facility] on a PRN basis

## 2021-10-02 NOTE — CONSULTS
Vascular Surgery Consultation Note    Reason for Consult:  DVT RLE    HPI:    This is a 52 y.o. male with PMHx DVT who presented to [place] due to RLE pain and swelling for 2 days. He states the pain started after slipping out of a truck yesterday. He heard a popping while walking and the pain progressively got worse. He states it was associated with swelling and redness from his ankle up to the knee. He has had these symptoms before when he had a DVT. He was previously on Eliquis but was recently switched to Xarelto 6 weeks ago. He denies any numbness, tingling, or weakness, but reports constant pain that is worsened with walking. He states that he is compliant with his Xarelto. Vascular surgery is consulted for evaluation and treatment of DVT.       ROS: Negative if blank [], Positive if [x]  General Vascular   [] Fevers [] Claudication (Blocks)   [] Chills [] Rest Pain   [] Weight Loss [] Tissue Loss   [] Chest Pain [] Clotting Disorder   [] SOB at rest [x] Leg Swelling   [] SOB with exertion [x] DVT/PE      [] Nausea    [] Vomiting [] Stroke/TIA   [] Abdominal Pain [] Focal weakness   [] Melena [] Slurred Speech   [] Hematochezia [] Vision Changes   [] Hematuria    [] Dysuria [] Hx of Central Catheters   [] Wears Glasses/Contacts [] Dialysis and If so date initiated   [] Blindness    [x] Right Hand Dominant   [] Difficulty swallowing        Past Medical History:   Diagnosis Date    Dog bite 08/24/2016    rabies vaccinations s/p dog bite    H/O blood clots     [facility] [address]     RA (rheumatoid arthritis) ([facility] [address]         Past Surgical History:   Procedure Laterality Date    APPENDECTOMY      CHOLECYSTECTOMY      KNEE CARTILAGE SURGERY Left 06/2017    MASTOID SURGERY      OTHER SURGICAL HISTORY  4/15/16    radiofrequency ablation left greater saphaneous vein and stab phlebectomies    WISDOM TOOTH EXTRACTION         Current Medications:    sodium chloride        sodium chloride flush, sodium chloride, ondansetron **OR** ondansetron, polyethylene glycol, acetaminophen **OR** acetaminophen    sodium chloride flush  5-40 mL IntraVENous 2 times per day    influenza virus vaccine  0.5 mL IntraMUSCular Prior to discharge    ceFAZolin  1,000 mg IntraVENous Q8H    rivaroxaban  20 mg Oral Daily        Allergies:  Patient has no known allergies. Social History     Socioeconomic History    Marital status: Single     Spouse name: Not on file    Number of children: Not on file    Years of education: Not on file    Highest education level: Not on file   Occupational History    Not on file   Tobacco Use    Smoking status: Former Smoker     Packs/day: 0.50     Quit date: 11/10/1994     Years since quittin.9    Smokeless tobacco: Current User     Types: Chew   Vaping Use    Vaping Use: Never used   Substance and Sexual Activity    Alcohol use: Yes     Alcohol/week: 6.0 standard drinks     Types: 6 Cans of beer per week     Comment: beer, a feww days per week    Drug use: No    Sexual activity: Not on file   Other Topics Concern    Not on file   Social History Narrative    Not on file     Social Determinants of Health     Financial Resource Strain:     Difficulty of Paying Living Expenses:    Food Insecurity:     Worried About Running Out of Food in the Last Year:     Ran Out of Food in the Last Year:    Transportation Needs:     Lack of Transportation (Medical):      Lack of Transportation (Non-Medical):    Physical Activity:     Days of Exercise per Week:     Minutes of Exercise per Session:    Stress:     Feeling of Stress :    Social Connections:     Frequency of Communication with Friends and Family:     Frequency of Social Gatherings with Friends and Family:     Attends [employer] Services:     Active Member of Clubs or Organizations:     Attends Club or Organization Meetings:     Marital Status:    Intimate Partner Violence:     Fear of Current or Ex-Partner:     Emotionally Abused:  Physically Abused:     Sexually Abused:         Family History   Problem Relation Age of Onset    Cancer Maternal Grandfather         throat       PHYSICAL EXAM:    BP (!) 143/92   Pulse 91   Temp 98.3 °F (36.8 °C) (Oral)   Resp 18   Ht 6' 3\" (1.905 m)   Wt 225 lb (102.1 kg)   SpO2 96%   BMI 28.12 kg/m²   CONSTITUTIONAL:  awake, alert, cooperative, no apparent distress, and appears stated age  NEURO:  Normal  EYES:  lids and lashes normal, sclera clear and conjunctiva normal  ENT:  normocepalic, without obvious abnormality, external ears without lesions  NECK:  supple, symmetrical, trachea midline, no jugular venous distension, no carotid bruits  LUNGS:  no increased work of breathing  CARDIOVASCULAR:  regular rate and rhythm  ABDOMEN:  soft, non-distended, non-tender, Aorta is not palpable  SKIN:  no bruising or bleeding    EXTREMITIES:    R UE Swelling absent Incisions absent       5/5 Strength    L UE Swelling absent Incisions absent       5/5 Strength    R LE Edema present     Incisions absent    Varicose veins absent    Wounds absent    normalcaprefill   5/5 Strength   Neuropathy is absent    L LE Edema absent     Incisions absent    Varicose veins absent    Wounds absent    normalcaprefill   5/5 Strength   Neuropathy is absent    R brachial 2 L brachial 2   R radial 2 L radial 2   R femoral 2 L femoral 2   R popliteal 2 L popliteal 2   R posterior tibial 2 L posterior tibial 2   R dorsalis pedis 2 L dorsalis pedis 2       LABS:    Lab Results   Component Value Date    WBC 1.6 (L) 10/01/2021    HGB 14.1 10/01/2021    HCT 41.9 10/01/2021     10/01/2021    PROTIME 19.7 (H) 10/01/2021    INR 1.8 10/01/2021    K 3.6 10/01/2021    BUN 10 10/01/2021    CREATININE 0.8 10/01/2021       RADIOLOGY:  CT TIBIA FIBULA RIGHT WO CONTRAST    Result Date: 10/1/2021  EXAMINATION: CT OF THE RIGHT TIBIA AND FIBULA WITHOUT CONTRAST 10/1/2021 3:52 pm TECHNIQUE: CT of the right tibia and fibula was performed without the administration of intravenous contrast.  Multiplanar reformatted images are provided for review. Dose modulation, iterative reconstruction, and/or weight based adjustment of the mA/kV was utilized to reduce the radiation dose to as low as reasonably achievable. COMPARISON: None. HISTORY ORDERING SYSTEM PROVIDED HISTORY: right leg injury, swelling, on xarelto, r/o hematoma or deep space infection TECHNOLOGIST PROVIDED HISTORY: Reason for exam:->right leg injury, swelling, on xarelto, r/o hematoma or deep space infection Decision Support Exception - unselect if not a suspected or confirmed emergency medical condition->Emergency Medical Condition (MA) FINDINGS: Bones: No fracture or other abnormality is seen at the level of the right knee or right leg. The tibia and fibula are unremarkable. No periosteal reaction or bony erosive changes. Soft Tissue: Mild-to-moderate subcutaneous edema is seen in the entire right leg. No hyperdense fluid collection indicative of hemorrhage is seen. No soft tissue gas is identified. Perifascial edema is seen along the medial gastrocnemius muscle (for example, series 2, image 84). There is mild fatty atrophy of the medial gastrocnemius muscle. 1. Subcutaneous edema in the right leg with perifascial edema along the medial gastrocnemius muscle which may signify fasciitis. No soft tissue gas is seen. 2. No CT evidence of osteomyelitis. US DUP LOWER EXTREMITY RIGHT ELIO    Result Date: 10/1/2021  EXAMINATION: DUPLEX VENOUS ULTRASOUND OF THE RIGHT LOWER EXTREMITY 10/1/2021 1:40 pm TECHNIQUE: Duplex ultrasound using B-mode/gray scaled imaging and Doppler spectral analysis and color flow was obtained of the deep venous structures of the right lower extremity. COMPARISON: None.  HISTORY: ORDERING SYSTEM PROVIDED HISTORY: right lower leg swelling and redness, history of DVT, on Xarelto FINDINGS: There is nonocclusive, echogenic thrombus from the mid superficial femoral vein into the right calf. This extends through the popliteal vein and may involve the peroneal vein. There is peripheral echogenic thrombus with recanalization. This may represent patient's known deep venous thrombosis. The right common femoral vein and proximal superficial femoral vein appear compressible without intraluminal echogenic thrombus. No abnormal fluid collections are identified within the soft tissues. Peripheral, nonocclusive thrombus from the mid right superficial femoral vein into the calf with central recanalization likely represents chronic deep venous thrombosis. Assesment/Plan  52 y.o. male with RLE DVT, likely chronic, possible ruptured Baker's Cyst    - Okay for diet from vascular POV  - Pt is currently on Xarelto and has been compliant with his medication but recently switched from Eliquis 6 weeks ago  - Continue Xarelto  - DVT US showed chronic DVT with recanalization showing improvement from June 2021  - The patient likely has cellulitis exacerbating his pain, continue abx per primary  - No plans for IVC filter at this time  - Discussed with Dr. Mary Walsh MD  10/2/21  2:53 AM EDT    Patient was seen and examined. Patient is well-known to the service he has nice palpable DP and PT pulses. Motor and sensation are intact. He has some mild swelling of the right lower extremity. Really in my opinion it does not look like there is cellulitis it is a little bit warm to the touch. 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 He otherwise Ottoniel is doing much better and ambulating well I reviewed his ultrasound this all appears to be chronic thrombus with recannulization. He is successfully been placed on Xarelto and Ottoniel has not missed any doses. I went over the regimen again regarding Xarelto.   From our standpoint continue with anticoagulation I told him to call if is any questions or concerns such as shortness of breath chest pain or discomfort or worsening right lower extremity leg swelling.     Nikkie Oquendo

## 2021-10-02 NOTE — ED NOTES
joselin medina. .  Linda Mari notified medication not available.        Ana Cristina Henry, DONNELL  10/02/21 [id number]

## 2021-10-02 NOTE — PLAN OF CARE
The story reported to me by ED strongly suspicious of a ruptured Baker's cyst.  The DVT may be a red herring. The image uploaded of the calf also is suggestive of Baker's cyst, as well as the edema along the gastroc noted on CT    I'm trying to reach radiology to discuss the CT findings, to check if there's any other sign of ruptured Baker's cyst on imaging. Requested to speak to rads at 8 PM.  Awaiting callback.

## 2021-10-02 NOTE — H&P
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 He felt immediate severe nonradiating pain of his right calf, stiffness and pain with movement of the right leg and ankle, as well as some swelling and redness of the calf. He recalls being told he had a Baker's cyst in the past on ultrasound. He denies fevers. He denies any type of wounds to the right leg. He presented to [facility] ED where he underwent CT scan of the right leg as well as right lower extremity ultrasound. He was found to have a chronic DVT of the right leg which was previously known. The CT scan mention questionable fasciitis, though after I discussed the clinical scenario with the radiologist, the radiologist felt that these findings also could be consistent with Bakers cyst rupture. The patient is feeling quite a lot better today. He still has some mild discomfort and erythema and swelling. Overall though the pain is much improved as well as the range of motion of his right lower extremity, which is now totally normal.       REVIEW OF SYSTEMS:  Pertinent negatives are above in HPI. 10 point ROS otherwise negative.       Past Medical History:   Diagnosis Date    Dog bite 08/24/2016    rabies vaccinations s/p dog bite    H/O blood clots     Lupus (HCC)     RA (rheumatoid arthritis) ([facility] [address]          Past Surgical History:   Procedure Laterality Date    APPENDECTOMY      CHOLECYSTECTOMY      KNEE CARTILAGE SURGERY Left 06/2017    MASTOID SURGERY  10/01/2021    MCV 86.7 10/01/2021    MCH 29.2 10/01/2021    MCHC 33.7 10/01/2021    RDW 14.6 10/01/2021    LYMPHOPCT 17.9 10/01/2021    MONOPCT 16.0 10/01/2021    BASOPCT 0.6 10/01/2021    MONOSABS 0.25 10/01/2021    LYMPHSABS 0.28 10/01/2021    EOSABS 0.01 10/01/2021    BASOSABS 0.01 10/01/2021     CMP:    Lab Results   Component Value Date     10/01/2021    K 3.6 10/01/2021    K 3.4 06/19/2021    CL 98 10/01/2021    CO2 28 10/01/2021    BUN 10 10/01/2021    CREATININE 0.8 10/01/2021    GFRAA >60 10/01/2021    LABGLOM >60 10/01/2021    GLUCOSE 88 10/01/2021    CALCIUM 8.4 10/01/2021       Imaging:  I've personally reviewed the patient's CT RLE  1. Subcutaneous edema in the right leg with perifascial edema along the   medial gastrocnemius muscle which may signify fasciitis.  No soft tissue gas   is seen. 2. No CT evidence of osteomyelitis. RLE Duplex US  Peripheral, nonocclusive thrombus from the mid right superficial femoral vein into the calf with central recanalization likely represents chronic deep venous thrombosis. ASSESSMENT/PLAN:  Principal Problem:    Ruptured Bakers cyst  Active Problems:    Rheumatoid arthritis ([facility] [address]    Deep vein thrombosis of right leg related to air travel ([facility] [address]    Lupus ([facility] [address]  Resolved Problems:    * No resolved hospital problems.  *      The history, examination, and imaging is all consistent with benign diagnosis of ruptured baker's cyst    He also recalls previously having been diagnosed with a Baker's cyst, though I couldn't find it on record in our ultrasounds on file    I am not concerned for fasciitis    I also have low suspicion for cellulitis, despite his chronic leukopenia which he tells me is related to his lupus (not neutropenic by definition)    Continue Xarelto unless Vascular wants otherwise     Code status: Full  Requires obs level of care  Meg Pan MD    6:23 AM  10/2/2021

## 2022-01-09 ENCOUNTER — HOSPITAL ENCOUNTER (INPATIENT)
Age: 52
LOS: 1 days | Discharge: HOME | DRG: 287 | End: 2022-01-10
Attending: EMERGENCY MEDICINE | Admitting: INTERNAL MEDICINE
Payer: COMMERCIAL

## 2022-01-09 ENCOUNTER — APPOINTMENT (OUTPATIENT)
Dept: GENERAL RADIOLOGY | Age: 52
DRG: 287 | End: 2022-01-09
Payer: COMMERCIAL

## 2022-01-09 DIAGNOSIS — I30.9 ACUTE PERICARDITIS, UNSPECIFIED TYPE: ICD-10-CM

## 2022-01-09 DIAGNOSIS — I21.3 ST ELEVATION MYOCARDIAL INFARCTION (STEMI), UNSPECIFIED ARTERY (HCC): Primary | ICD-10-CM

## 2022-01-09 PROBLEM — R07.9 CHEST PAIN: Status: ACTIVE | Noted: 2022-01-09

## 2022-01-09 LAB
ABO/RH: NORMAL
ALBUMIN SERPL-MCNC: 2.9 G/DL (ref 3.5–5.2)
ALP BLD-CCNC: 128 U/L (ref 40–129)
ALT SERPL-CCNC: 20 U/L (ref 0–40)
ANION GAP SERPL CALCULATED.3IONS-SCNC: 11 MMOL/L (ref 7–16)
ANTIBODY SCREEN: NORMAL
APTT: 33.1 SEC (ref 24.5–35.1)
AST SERPL-CCNC: 25 U/L (ref 0–39)
BILIRUB SERPL-MCNC: 0.8 MG/DL (ref 0–1.2)
BUN BLDV-MCNC: 20 MG/DL (ref 6–20)
C-REACTIVE PROTEIN: 15.8 MG/DL (ref 0–0.4)
CALCIUM SERPL-MCNC: 9 MG/DL (ref 8.6–10.2)
CHLORIDE BLD-SCNC: 95 MMOL/L (ref 98–107)
CO2: 26 MMOL/L (ref 22–29)
CREAT SERPL-MCNC: 1.5 MG/DL (ref 0.7–1.2)
GFR AFRICAN AMERICAN: >60
GFR NON-AFRICAN AMERICAN: 50 ML/MIN/1.73
GLUCOSE BLD-MCNC: 139 MG/DL (ref 74–99)
HCT VFR BLD CALC: 37.4 % (ref 37–54)
HEMOGLOBIN: 12.4 G/DL (ref 12.5–16.5)
LV EF: 58 %
LVEF MODALITY: NORMAL
MCH RBC QN AUTO: 28.8 PG (ref 26–35)
MCHC RBC AUTO-ENTMCNC: 33.2 % (ref 32–34.5)
MCV RBC AUTO: 87 FL (ref 80–99.9)
PDW BLD-RTO: 13.8 FL (ref 11.5–15)
PLATELET # BLD: 146 E9/L (ref 130–450)
PMV BLD AUTO: 12.3 FL (ref 7–12)
POC ACT LR: 209 SECONDS
POTASSIUM REFLEX MAGNESIUM: 4.1 MMOL/L (ref 3.5–5)
PRO-BNP: 175 PG/ML (ref 0–125)
RBC # BLD: 4.3 E12/L (ref 3.8–5.8)
SARS-COV-2, NAAT: NOT DETECTED
SEDIMENTATION RATE, ERYTHROCYTE: 120 MM/HR (ref 0–15)
SODIUM BLD-SCNC: 132 MMOL/L (ref 132–146)
TOTAL PROTEIN: 7.4 G/DL (ref 6.4–8.3)
TROPONIN, HIGH SENSITIVITY: 20 NG/L (ref 0–11)
WBC # BLD: 1.9 E9/L (ref 4.5–11.5)

## 2022-01-09 PROCEDURE — 6370000000 HC RX 637 (ALT 250 FOR IP): Performed by: INTERNAL MEDICINE

## 2022-01-09 PROCEDURE — 6370000000 HC RX 637 (ALT 250 FOR IP)

## 2022-01-09 PROCEDURE — 85730 THROMBOPLASTIN TIME PARTIAL: CPT

## 2022-01-09 PROCEDURE — 2500000003 HC RX 250 WO HCPCS

## 2022-01-09 PROCEDURE — 36415 COLL VENOUS BLD VENIPUNCTURE: CPT

## 2022-01-09 PROCEDURE — 86140 C-REACTIVE PROTEIN: CPT

## 2022-01-09 PROCEDURE — C1887 CATHETER, GUIDING: HCPCS

## 2022-01-09 PROCEDURE — 84484 ASSAY OF TROPONIN QUANT: CPT

## 2022-01-09 PROCEDURE — 87635 SARS-COV-2 COVID-19 AMP PRB: CPT

## 2022-01-09 PROCEDURE — 93458 L HRT ARTERY/VENTRICLE ANGIO: CPT

## 2022-01-09 PROCEDURE — 99285 EMERGENCY DEPT VISIT HI MDM: CPT

## 2022-01-09 PROCEDURE — 86850 RBC ANTIBODY SCREEN: CPT

## 2022-01-09 PROCEDURE — 85347 COAGULATION TIME ACTIVATED: CPT

## 2022-01-09 PROCEDURE — 99024 POSTOP FOLLOW-UP VISIT: CPT | Performed by: INTERNAL MEDICINE

## 2022-01-09 PROCEDURE — 4A023N7 MEASUREMENT OF CARDIAC SAMPLING AND PRESSURE, LEFT HEART, PERCUTANEOUS APPROACH: ICD-10-PCS | Performed by: INTERNAL MEDICINE

## 2022-01-09 PROCEDURE — 85651 RBC SED RATE NONAUTOMATED: CPT

## 2022-01-09 PROCEDURE — 85027 COMPLETE CBC AUTOMATED: CPT

## 2022-01-09 PROCEDURE — 71045 X-RAY EXAM CHEST 1 VIEW: CPT

## 2022-01-09 PROCEDURE — 2709999900 HC NON-CHARGEABLE SUPPLY

## 2022-01-09 PROCEDURE — 93306 TTE W/DOPPLER COMPLETE: CPT

## 2022-01-09 PROCEDURE — 80053 COMPREHEN METABOLIC PANEL: CPT

## 2022-01-09 PROCEDURE — 2580000003 HC RX 258: Performed by: INTERNAL MEDICINE

## 2022-01-09 PROCEDURE — 83880 ASSAY OF NATRIURETIC PEPTIDE: CPT

## 2022-01-09 PROCEDURE — 93005 ELECTROCARDIOGRAM TRACING: CPT | Performed by: EMERGENCY MEDICINE

## 2022-01-09 PROCEDURE — B2111ZZ FLUOROSCOPY OF MULTIPLE CORONARY ARTERIES USING LOW OSMOLAR CONTRAST: ICD-10-PCS | Performed by: INTERNAL MEDICINE

## 2022-01-09 PROCEDURE — 6360000002 HC RX W HCPCS

## 2022-01-09 PROCEDURE — 86900 BLOOD TYPING SEROLOGIC ABO: CPT

## 2022-01-09 PROCEDURE — 93458 L HRT ARTERY/VENTRICLE ANGIO: CPT | Performed by: INTERNAL MEDICINE

## 2022-01-09 PROCEDURE — 2140000000 HC CCU INTERMEDIATE R&B

## 2022-01-09 PROCEDURE — C1894 INTRO/SHEATH, NON-LASER: HCPCS

## 2022-01-09 PROCEDURE — C1769 GUIDE WIRE: HCPCS

## 2022-01-09 PROCEDURE — 96374 THER/PROPH/DIAG INJ IV PUSH: CPT

## 2022-01-09 PROCEDURE — 2580000003 HC RX 258: Performed by: EMERGENCY MEDICINE

## 2022-01-09 PROCEDURE — 86901 BLOOD TYPING SEROLOGIC RH(D): CPT

## 2022-01-09 RX ORDER — IBUPROFEN 400 MG/1
600 TABLET ORAL
Status: DISCONTINUED | OUTPATIENT
Start: 2022-01-09 | End: 2022-01-10

## 2022-01-09 RX ORDER — ASPIRIN 81 MG/1
324 TABLET, CHEWABLE ORAL ONCE
Status: COMPLETED | OUTPATIENT
Start: 2022-01-09 | End: 2022-01-09

## 2022-01-09 RX ORDER — ASPIRIN 81 MG/1
TABLET, CHEWABLE ORAL
Status: COMPLETED
Start: 2022-01-09 | End: 2022-01-09

## 2022-01-09 RX ORDER — NITROGLYCERIN 20 MG/100ML
5-200 INJECTION INTRAVENOUS CONTINUOUS
Status: DISCONTINUED | OUTPATIENT
Start: 2022-01-09 | End: 2022-01-09

## 2022-01-09 RX ORDER — HEPARIN SODIUM 1000 [USP'U]/ML
4000 INJECTION, SOLUTION INTRAVENOUS; SUBCUTANEOUS PRN
Status: DISCONTINUED | OUTPATIENT
Start: 2022-01-09 | End: 2022-01-10

## 2022-01-09 RX ORDER — SODIUM CHLORIDE 0.9 % (FLUSH) 0.9 %
5-40 SYRINGE (ML) INJECTION PRN
Status: DISCONTINUED | OUTPATIENT
Start: 2022-01-09 | End: 2022-01-10 | Stop reason: HOSPADM

## 2022-01-09 RX ORDER — CARVEDILOL 6.25 MG/1
6.25 TABLET ORAL 2 TIMES DAILY WITH MEALS
Status: ON HOLD | COMMUNITY
End: 2022-01-10 | Stop reason: SDUPTHER

## 2022-01-09 RX ORDER — HEPARIN SODIUM 10000 [USP'U]/100ML
INJECTION, SOLUTION INTRAVENOUS
Status: COMPLETED
Start: 2022-01-09 | End: 2022-01-09

## 2022-01-09 RX ORDER — HEPARIN SODIUM 1000 [USP'U]/ML
2000 INJECTION, SOLUTION INTRAVENOUS; SUBCUTANEOUS PRN
Status: DISCONTINUED | OUTPATIENT
Start: 2022-01-09 | End: 2022-01-10

## 2022-01-09 RX ORDER — HEPARIN SODIUM 1000 [USP'U]/ML
4000 INJECTION, SOLUTION INTRAVENOUS; SUBCUTANEOUS ONCE
Status: COMPLETED | OUTPATIENT
Start: 2022-01-09 | End: 2022-01-09

## 2022-01-09 RX ORDER — SODIUM CHLORIDE 9 MG/ML
INJECTION, SOLUTION INTRAVENOUS CONTINUOUS
Status: ACTIVE | OUTPATIENT
Start: 2022-01-09 | End: 2022-01-10

## 2022-01-09 RX ORDER — 0.9 % SODIUM CHLORIDE 0.9 %
1000 INTRAVENOUS SOLUTION INTRAVENOUS ONCE
Status: COMPLETED | OUTPATIENT
Start: 2022-01-09 | End: 2022-01-09

## 2022-01-09 RX ORDER — COLCHICINE 0.6 MG/1
0.6 TABLET ORAL DAILY
Status: DISCONTINUED | OUTPATIENT
Start: 2022-01-09 | End: 2022-01-10

## 2022-01-09 RX ORDER — HEPARIN SODIUM 1000 [USP'U]/ML
INJECTION, SOLUTION INTRAVENOUS; SUBCUTANEOUS
Status: COMPLETED
Start: 2022-01-09 | End: 2022-01-09

## 2022-01-09 RX ORDER — ACETAMINOPHEN 325 MG/1
650 TABLET ORAL EVERY 4 HOURS PRN
Status: DISCONTINUED | OUTPATIENT
Start: 2022-01-09 | End: 2022-01-10 | Stop reason: HOSPADM

## 2022-01-09 RX ORDER — HEPARIN SODIUM 10000 [USP'U]/100ML
5-30 INJECTION, SOLUTION INTRAVENOUS CONTINUOUS
Status: DISCONTINUED | OUTPATIENT
Start: 2022-01-09 | End: 2022-01-09

## 2022-01-09 RX ORDER — SODIUM CHLORIDE 9 MG/ML
25 INJECTION, SOLUTION INTRAVENOUS PRN
Status: DISCONTINUED | OUTPATIENT
Start: 2022-01-09 | End: 2022-01-10 | Stop reason: HOSPADM

## 2022-01-09 RX ORDER — SODIUM CHLORIDE 0.9 % (FLUSH) 0.9 %
5-40 SYRINGE (ML) INJECTION EVERY 12 HOURS SCHEDULED
Status: DISCONTINUED | OUTPATIENT
Start: 2022-01-09 | End: 2022-01-10 | Stop reason: HOSPADM

## 2022-01-09 RX ORDER — PANTOPRAZOLE SODIUM 40 MG/1
40 TABLET, DELAYED RELEASE ORAL
Status: DISCONTINUED | OUTPATIENT
Start: 2022-01-10 | End: 2022-01-10 | Stop reason: HOSPADM

## 2022-01-09 RX ADMIN — ASPIRIN 324 MG: 81 TABLET, CHEWABLE ORAL at 11:40

## 2022-01-09 RX ADMIN — ASPIRIN 81 MG CHEWABLE TABLET 324 MG: 81 TABLET CHEWABLE at 11:40

## 2022-01-09 RX ADMIN — HEPARIN SODIUM 4000 UNITS: 1000 INJECTION, SOLUTION INTRAVENOUS; SUBCUTANEOUS at 11:50

## 2022-01-09 RX ADMIN — SODIUM CHLORIDE: 9 INJECTION, SOLUTION INTRAVENOUS at 15:33

## 2022-01-09 RX ADMIN — SODIUM CHLORIDE 1000 ML: 9 INJECTION, SOLUTION INTRAVENOUS at 13:00

## 2022-01-09 RX ADMIN — SODIUM CHLORIDE 1000 ML: 9 INJECTION, SOLUTION INTRAVENOUS at 11:55

## 2022-01-09 RX ADMIN — COLCHICINE 0.6 MG: 0.6 TABLET ORAL at 17:59

## 2022-01-09 RX ADMIN — IBUPROFEN 600 MG: 400 TABLET ORAL at 18:44

## 2022-01-09 RX ADMIN — Medication 12 UNITS/KG/HR: at 11:52

## 2022-01-09 RX ADMIN — HEPARIN SODIUM 12 UNITS/KG/HR: 10000 INJECTION, SOLUTION INTRAVENOUS at 11:52

## 2022-01-09 ASSESSMENT — PAIN - FUNCTIONAL ASSESSMENT: PAIN_FUNCTIONAL_ASSESSMENT: ACTIVITIES ARE NOT PREVENTED

## 2022-01-09 ASSESSMENT — PAIN DESCRIPTION - ORIENTATION: ORIENTATION: LEFT

## 2022-01-09 ASSESSMENT — ENCOUNTER SYMPTOMS
DIARRHEA: 0
ABDOMINAL PAIN: 0
ABDOMINAL DISTENTION: 0
WHEEZING: 0
SINUS PAIN: 0
VOMITING: 0
SHORTNESS OF BREATH: 0
SINUS PRESSURE: 0
CHEST TIGHTNESS: 0
CONSTIPATION: 0
COUGH: 0
NAUSEA: 0

## 2022-01-09 ASSESSMENT — PAIN DESCRIPTION - PAIN TYPE: TYPE: ACUTE PAIN

## 2022-01-09 ASSESSMENT — PAIN SCALES - GENERAL
PAINLEVEL_OUTOF10: 0
PAINLEVEL_OUTOF10: 5

## 2022-01-09 ASSESSMENT — PAIN DESCRIPTION - DESCRIPTORS: DESCRIPTORS: ACHING

## 2022-01-09 ASSESSMENT — PAIN DESCRIPTION - LOCATION: LOCATION: SHOULDER

## 2022-01-09 NOTE — ED PROVIDER NOTES
cardiology at [facility]. Review of Systems   Constitutional: Positive for chills, diaphoresis, fatigue and fever. HENT: Negative for sinus pressure and sinus pain. Eyes: Negative for visual disturbance. Respiratory: Negative for cough, chest tightness, shortness of breath and wheezing. Cardiovascular: Positive for chest pain. Negative for palpitations and leg swelling. Gastrointestinal: Negative for abdominal distention, abdominal pain, constipation, diarrhea, nausea and vomiting. Genitourinary: Negative for frequency, hematuria and urgency. Musculoskeletal: Negative for arthralgias and myalgias. Skin: Negative for rash and wound. Neurological: Negative for dizziness, tremors, seizures, syncope, speech difficulty, weakness, light-headedness, numbness and headaches. Hematological: Negative for adenopathy. Does not bruise/bleed easily. Psychiatric/Behavioral: Negative for agitation, confusion and hallucinations. BP (!) 90/57   Pulse 85   Temp 97.3 °F (36.3 °C) (Temporal)   Resp 16   Ht 6' 3\" (1.905 m)   Wt 222 lb (100.7 kg)   SpO2 98%   BMI 27.75 kg/m²   Physical Exam  Vitals reviewed. Constitutional:       General: He is in acute distress. Appearance: Normal appearance. He is normal weight. He is diaphoretic. He is not ill-appearing or toxic-appearing. HENT:      Head: Normocephalic and atraumatic. Right Ear: External ear normal.      Left Ear: External ear normal.      Nose: Nose normal.      Mouth/Throat:      Mouth: Mucous membranes are moist.      Pharynx: Oropharynx is clear. No oropharyngeal exudate or posterior oropharyngeal erythema. Eyes:      Extraocular Movements: Extraocular movements intact. Conjunctiva/sclera: Conjunctivae normal.      Pupils: Pupils are equal, round, and reactive to light. Cardiovascular:      Rate and Rhythm: Normal rate and regular rhythm. Pulses: Normal pulses.       Heart sounds: Normal heart sounds. Pulmonary:      Effort: Pulmonary effort is normal. No respiratory distress. Breath sounds: Normal breath sounds. No stridor. No wheezing or rhonchi. Abdominal:      General: Abdomen is flat. Bowel sounds are normal. There is no distension. Palpations: Abdomen is soft. There is no mass. Tenderness: There is no abdominal tenderness. There is no guarding or rebound. Musculoskeletal:         General: No swelling, tenderness, deformity or signs of injury. Normal range of motion. Cervical back: Normal range of motion. Right lower leg: No edema. Left lower leg: No edema. Skin:     General: Skin is warm. Capillary Refill: Capillary refill takes less than 2 seconds. Coloration: Skin is not jaundiced or pale. Findings: No bruising, erythema, lesion or rash. Neurological:      General: No focal deficit present. Mental Status: He is alert and oriented to person, place, and time. Cranial Nerves: No cranial nerve deficit. Motor: No weakness. Psychiatric:         Mood and Affect: Mood normal.         Behavior: Behavior normal.         Thought Content: Thought content normal.          Procedures       MDM  Number of Diagnoses or Management Options  ST elevation myocardial infarction (STEMI), unspecified artery ([facility] [address]  Diagnosis management comments:   Chest pain, worsening, with ST elevation on EKG in the precordial and inferior leads concerning for acute MI, cannot rule out pericarditis/myopericarditis. Patient aspirin 324 mg chewable tablet  Start patient on heparin bolus and heparin drip. Contacted cardiac cath lab and discussed with cardiology- patient to go to Cath Lab emergently for left heart catheterization. Updated patient's mother over the phone. She had no further questions.        Amount and/or Complexity of Data Reviewed  Clinical lab tests: ordered  Tests in the radiology section of CPT®: ordered  Tests in the medicine section of CPT®: ordered             Patient was seen and evaluated by myself and my attending Karol Dowd, MD. Assessment and Plan discussed with attending provider, please see attestation for final plan of care.      Tara Floyd, DO Tara Floyd, [place]  Resident  01/09/22 0790

## 2022-01-09 NOTE — ED NOTES
Pt placed on defibrillator pads per verbal order from Dr. Martín Joiner.      Albert Puente, RN  66/86/09 1204

## 2022-01-09 NOTE — PROCEDURES
[address]                  [address] [facility]  [facility]                            CARDIAC CATHETERIZATION    PATIENT NAME: Ksenia Wilkinson                    :        1970  MED REC NO:   [id number]                            ROOM:       25  ACCOUNT NO:   [de-identified]                           ADMIT DATE: 2022  PROVIDER:     Regulo Cohn MD    DATE OF PROCEDURE:  2022    EMERGENCY LEFT HEART CATHETERIZATION    INDICATION:  Chest pain with possible anterior wall myocardial  infarction. PROCEDURE NOTE:  The patient presented to the emergency room this  morning due to on and off chest discomfort for the past few days. He  was found to be in sinus tachycardia with ST elevation in the inferior  and precordial leads. The patient was given four tablets of aspirin, a  bolus of heparin, and was started on heparin drip and was referred for  emergency left heart catheterization. He was brought to the cardiac  cath lab. He was still having some discomfort, but he was comfortable. Under sterile condition and under local anesthetic and using conscious  sedation, a 6-Guyanese Terumo Slender sheath was inserted into his right  radial artery. His ACT was 209. He received 2.5 mg of diluted  verapamil via the right radial sheath. Then, a 5-Guyanese TIG diagnostic  catheter was advanced over a J-tip wire to the ascending aorta without  difficulty. The right coronary artery was engaged and a coronary  angiogram was done. Then, the left main coronary artery was engaged and  a coronary angiogram was done. This catheter was exchanged over a  guidewire to a 5-Guyanese pigtail which was advanced into the left  ventricle without difficulty. The left ventricular end-diastolic  pressure was measured. No left ventriculogram was done due to elevated  creatinine of 1.5. There was no significant gradient across the aortic  valve.   At the end of the procedure, the pigtail was pulled out. The  Terumo Slender sheath was pulled out and a Vasc Band was applied over  the right radial artery with good hemostasis. The patient tolerated the  procedure very well and no complications were noted. No significant  blood loss occurred during the procedure. The patient received 200 mcg  of intravenous Nicholas-Synephrine due to a symptomatic low blood pressure. FINDINGS:  HEMODYNAMICS:  1. Aortic pressure 100/51 mmHg. 2.  Left ventricular end-diastolic pressure of 16 mmHg. CORONARY ANATOMY:  LEFT MAIN:  It is a large and short artery with no angiographic stenosis  noted. LAD:  It is a large artery wrapping around the apex and giving rise to  two large septal  and two very small diagonal branches. The  LAD tapers in size in the apical segment. No angiographic stenosis  noted. LEFT CIRCUMFLEX:  It is a large artery giving rise to a large first  obtuse marginal branch, a probable SA kuldeep branch, and a small  bifurcating second obtuse marginal branch. No angiographic stenosis  noted. RCA:  It is a large artery which has an anterior takeoff. It gives rise to  a conus branch, two RV marginal branches, a PDA and a small PLV branch. No angiographic stenosis noted. IMPRESSION:  1. Absence of epicardial coronary artery stenosis. 2.  LVEDP 16 mmHg. RECOMMENDATIONS:  Obtain an echocardiogram to rule out  pericarditis/pericardial effusion. PROCEDURE START TIME:  2516. PROCEDURE END TIME:  1300. CONTRAST VOLUME:  20 mL of Isovue. FLUOROSCOPY TIME:  3.1 minutes. CONSCIOUS SEDATION:  1 mg of intravenous Versed and 50 mcg of  intravenous fentanyl.         Angel Gomez MD    D: 01/09/2022 13:19:01       T: 01/09/2022 13:21:20     [id number]  Job#: [id number]     Doc#: [id number]    CC:

## 2022-01-09 NOTE — CONSULTS
Reason for consult: Chest pain with possible STEMI.    HPI: 44-year-old obese non-smoker male who presented to the emergency room this morning due to chest pain. Patient blood pressure was low. Patient was found to be in sinus tachycardia with ST elevation in the precordial and inferior leads. He was started on the venous fluid was given aspirin, a bolus of heparin and was started on heparin drip and was referred for emergency left heart catheterization. Patient was seen by PCP last Tuesday due to shortness of breath and was given Z-Raj. He has history of lupus, rheumatoid arthritis, Sjogren, Raynaud's phenomena, and status post DVT of the right lower extremity and has been on Xarelto. He also has history of CHF and was evaluated at [facility] in the past    Past Medical History:   Diagnosis Date    Dog bite 08/24/2016    rabies vaccinations s/p dog bite    H/O blood clots     Lup[facility]     RA (rheumatoid arthritis) ([facility]        Past Surgical History:   Procedure Laterality Date    APPENDECTOMY      CHOLECYSTECTOMY      KNEE CARTILAGE SURGERY Left 06/2017    MASTOID SURGERY      OTHER SURGICAL HISTORY  4/15/16    radiofrequency ablation left greater saphaneous vein and stab phlebectomies    WISDOM TOOTH EXTRACTION         OP Meds:  Prior to Admission medications    Medication Sig Start Date End Date Taking? Authorizing Provider   rivaroxaban (XARELTO) 20 MG TABS tablet Take 20 mg by mouth daily (with breakfast)    Historical Provider, MD   BENLYSTA 200 MG/ML SOAJ every 7 days  3/17/21   Historical Provider, MD        IP Meds:    sodium chloride  1,000 mL IntraVENous Once    colchicine  0.6 mg Oral Daily    ibuprofen  600 mg Oral TID WC    [START ON 1/10/2022] pantoprazole  40 mg Oral QAM AC       Allergies: Patient has no known allergies. Social:  reports that he quit smoking about 27 years ago. He smoked 0.50 packs per day. His smokeless tobacco use includes chew.  He reports current alcohol use of

## 2022-01-10 VITALS
WEIGHT: 222 LBS | DIASTOLIC BLOOD PRESSURE: 64 MMHG | TEMPERATURE: 98.1 F | RESPIRATION RATE: 16 BRPM | BODY MASS INDEX: 27.6 KG/M2 | HEIGHT: 75 IN | OXYGEN SATURATION: 97 % | HEART RATE: 79 BPM | SYSTOLIC BLOOD PRESSURE: 96 MMHG

## 2022-01-10 PROBLEM — I31.39 PERICARDIAL EFFUSION: Status: ACTIVE | Noted: 2022-01-10

## 2022-01-10 PROBLEM — I31.9 PERICARDITIS: Status: ACTIVE | Noted: 2022-01-10

## 2022-01-10 PROBLEM — N17.9 AKI (ACUTE KIDNEY INJURY) (HCC): Status: ACTIVE | Noted: 2022-01-10

## 2022-01-10 LAB
ADENOVIRUS BY PCR: NOT DETECTED
ANION GAP SERPL CALCULATED.3IONS-SCNC: 13 MMOL/L (ref 7–16)
BACTERIA: ABNORMAL /HPF
BILIRUBIN URINE: NEGATIVE
BLOOD, URINE: NEGATIVE
BORDETELLA PARAPERTUSSIS BY PCR: NOT DETECTED
BORDETELLA PERTUSSIS BY PCR: NOT DETECTED
BUN BLDV-MCNC: 21 MG/DL (ref 6–20)
C3 COMPLEMENT: 91 MG/DL (ref 90–180)
C4 COMPLEMENT: 13 MG/DL (ref 10–40)
CALCIUM SERPL-MCNC: 9 MG/DL (ref 8.6–10.2)
CHLAMYDOPHILIA PNEUMONIAE BY PCR: NOT DETECTED
CHLORIDE BLD-SCNC: 98 MMOL/L (ref 98–107)
CLARITY: CLEAR
CO2: 23 MMOL/L (ref 22–29)
COLOR: YELLOW
CORONAVIRUS 229E BY PCR: NOT DETECTED
CORONAVIRUS HKU1 BY PCR: NOT DETECTED
CORONAVIRUS NL63 BY PCR: NOT DETECTED
CORONAVIRUS OC43 BY PCR: NOT DETECTED
CREAT SERPL-MCNC: 1 MG/DL (ref 0.7–1.2)
CRYSTALS, UA: ABNORMAL /HPF
EKG ATRIAL RATE: 81 BPM
EKG P AXIS: 2 DEGREES
EKG P-R INTERVAL: 208 MS
EKG Q-T INTERVAL: 372 MS
EKG QRS DURATION: 98 MS
EKG QTC CALCULATION (BAZETT): 432 MS
EKG R AXIS: 32 DEGREES
EKG T AXIS: 31 DEGREES
EKG VENTRICULAR RATE: 81 BPM
GFR AFRICAN AMERICAN: >60
GFR NON-AFRICAN AMERICAN: >60 ML/MIN/1.73
GLUCOSE BLD-MCNC: 101 MG/DL (ref 74–99)
GLUCOSE URINE: NEGATIVE MG/DL
HCT VFR BLD CALC: 37.5 % (ref 37–54)
HEMOGLOBIN: 12.1 G/DL (ref 12.5–16.5)
HUMAN METAPNEUMOVIRUS BY PCR: NOT DETECTED
HUMAN RHINOVIRUS/ENTEROVIRUS BY PCR: NOT DETECTED
INFLUENZA A BY PCR: NOT DETECTED
INFLUENZA B BY PCR: NOT DETECTED
KETONES, URINE: NEGATIVE MG/DL
LEUKOCYTE ESTERASE, URINE: NEGATIVE
MCH RBC QN AUTO: 28.7 PG (ref 26–35)
MCHC RBC AUTO-ENTMCNC: 32.3 % (ref 32–34.5)
MCV RBC AUTO: 89.1 FL (ref 80–99.9)
MYCOPLASMA PNEUMONIAE BY PCR: NOT DETECTED
NITRITE, URINE: NEGATIVE
PARAINFLUENZA VIRUS 1 BY PCR: NOT DETECTED
PARAINFLUENZA VIRUS 2 BY PCR: NOT DETECTED
PARAINFLUENZA VIRUS 3 BY PCR: NOT DETECTED
PARAINFLUENZA VIRUS 4 BY PCR: NOT DETECTED
PDW BLD-RTO: 13.7 FL (ref 11.5–15)
PH UA: 5.5 (ref 5–9)
PLATELET # BLD: 123 E9/L (ref 130–450)
PMV BLD AUTO: 12.2 FL (ref 7–12)
POTASSIUM SERPL-SCNC: 3.8 MMOL/L (ref 3.5–5)
PROTEIN UA: NEGATIVE MG/DL
RBC # BLD: 4.21 E12/L (ref 3.8–5.8)
RBC UA: ABNORMAL /HPF (ref 0–2)
RESPIRATORY SYNCYTIAL VIRUS BY PCR: NOT DETECTED
SARS-COV-2, PCR: NOT DETECTED
SODIUM BLD-SCNC: 134 MMOL/L (ref 132–146)
SPECIFIC GRAVITY UA: 1.02 (ref 1–1.03)
UROBILINOGEN, URINE: 0.2 E.U./DL
WBC # BLD: 1.4 E9/L (ref 4.5–11.5)
WBC UA: ABNORMAL /HPF (ref 0–5)

## 2022-01-10 PROCEDURE — 86160 COMPLEMENT ANTIGEN: CPT

## 2022-01-10 PROCEDURE — 81001 URINALYSIS AUTO W/SCOPE: CPT

## 2022-01-10 PROCEDURE — 93010 ELECTROCARDIOGRAM REPORT: CPT | Performed by: INTERNAL MEDICINE

## 2022-01-10 PROCEDURE — 0202U NFCT DS 22 TRGT SARS-COV-2: CPT

## 2022-01-10 PROCEDURE — 86038 ANTINUCLEAR ANTIBODIES: CPT

## 2022-01-10 PROCEDURE — 2580000003 HC RX 258: Performed by: INTERNAL MEDICINE

## 2022-01-10 PROCEDURE — 6370000000 HC RX 637 (ALT 250 FOR IP): Performed by: INTERNAL MEDICINE

## 2022-01-10 PROCEDURE — 36415 COLL VENOUS BLD VENIPUNCTURE: CPT

## 2022-01-10 PROCEDURE — 80048 BASIC METABOLIC PNL TOTAL CA: CPT

## 2022-01-10 PROCEDURE — 99233 SBSQ HOSP IP/OBS HIGH 50: CPT | Performed by: INTERNAL MEDICINE

## 2022-01-10 PROCEDURE — 86225 DNA ANTIBODY NATIVE: CPT

## 2022-01-10 PROCEDURE — 85027 COMPLETE CBC AUTOMATED: CPT

## 2022-01-10 PROCEDURE — 86256 FLUORESCENT ANTIBODY TITER: CPT

## 2022-01-10 PROCEDURE — 93005 ELECTROCARDIOGRAM TRACING: CPT | Performed by: INTERNAL MEDICINE

## 2022-01-10 PROCEDURE — 86039 ANTINUCLEAR ANTIBODIES (ANA): CPT

## 2022-01-10 PROCEDURE — 6360000002 HC RX W HCPCS: Performed by: INTERNAL MEDICINE

## 2022-01-10 RX ORDER — SODIUM CHLORIDE, SODIUM LACTATE, POTASSIUM CHLORIDE, CALCIUM CHLORIDE 600; 310; 30; 20 MG/100ML; MG/100ML; MG/100ML; MG/100ML
INJECTION, SOLUTION INTRAVENOUS CONTINUOUS
Status: DISCONTINUED | OUTPATIENT
Start: 2022-01-10 | End: 2022-01-10

## 2022-01-10 RX ORDER — CARVEDILOL 6.25 MG/1
6.25 TABLET ORAL 2 TIMES DAILY WITH MEALS
Status: DISCONTINUED | OUTPATIENT
Start: 2022-01-10 | End: 2022-01-10

## 2022-01-10 RX ORDER — COLCHICINE 0.6 MG/1
0.6 TABLET ORAL 2 TIMES DAILY
Qty: 60 TABLET | Refills: 1 | Status: SHIPPED | OUTPATIENT
Start: 2022-01-10

## 2022-01-10 RX ORDER — PANTOPRAZOLE SODIUM 40 MG/1
40 TABLET, DELAYED RELEASE ORAL
Qty: 30 TABLET | Refills: 1 | Status: SHIPPED | OUTPATIENT
Start: 2022-01-11

## 2022-01-10 RX ORDER — CARVEDILOL 6.25 MG/1
3.12 TABLET ORAL 2 TIMES DAILY WITH MEALS
Qty: 60 TABLET | Refills: 3 | Status: SHIPPED
Start: 2022-01-10

## 2022-01-10 RX ORDER — IBUPROFEN 200 MG
400 TABLET ORAL
Status: DISCONTINUED | OUTPATIENT
Start: 2022-01-10 | End: 2022-01-10

## 2022-01-10 RX ORDER — METHYLPREDNISOLONE SODIUM SUCCINATE 125 MG/2ML
200 INJECTION, POWDER, LYOPHILIZED, FOR SOLUTION INTRAMUSCULAR; INTRAVENOUS ONCE
Status: COMPLETED | OUTPATIENT
Start: 2022-01-10 | End: 2022-01-10

## 2022-01-10 RX ORDER — COLCHICINE 0.6 MG/1
0.6 TABLET ORAL 2 TIMES DAILY
Status: DISCONTINUED | OUTPATIENT
Start: 2022-01-10 | End: 2022-01-10 | Stop reason: HOSPADM

## 2022-01-10 RX ORDER — PREDNISONE 20 MG/1
TABLET ORAL
Qty: 45 TABLET | Refills: 1 | Status: SHIPPED | OUTPATIENT
Start: 2022-01-10 | End: 2022-01-30

## 2022-01-10 RX ADMIN — RIVAROXABAN 20 MG: 20 TABLET, FILM COATED ORAL at 08:44

## 2022-01-10 RX ADMIN — SODIUM CHLORIDE, PRESERVATIVE FREE 10 ML: 5 INJECTION INTRAVENOUS at 08:44

## 2022-01-10 RX ADMIN — IBUPROFEN 400 MG: 200 TABLET, FILM COATED ORAL at 08:44

## 2022-01-10 RX ADMIN — SODIUM CHLORIDE, POTASSIUM CHLORIDE, SODIUM LACTATE AND CALCIUM CHLORIDE: 600; 310; 30; 20 INJECTION, SOLUTION INTRAVENOUS at 08:43

## 2022-01-10 RX ADMIN — COLCHICINE 0.6 MG: 0.6 TABLET ORAL at 08:44

## 2022-01-10 RX ADMIN — PANTOPRAZOLE SODIUM 40 MG: 40 TABLET, DELAYED RELEASE ORAL at 07:17

## 2022-01-10 RX ADMIN — METHYLPREDNISOLONE SODIUM SUCCINATE 200 MG: 125 INJECTION, POWDER, FOR SOLUTION INTRAMUSCULAR; INTRAVENOUS at 14:02

## 2022-01-10 ASSESSMENT — PAIN DESCRIPTION - PAIN TYPE: TYPE: ACUTE PAIN

## 2022-01-10 ASSESSMENT — PAIN SCALES - GENERAL
PAINLEVEL_OUTOF10: 0
PAINLEVEL_OUTOF10: 4

## 2022-01-10 ASSESSMENT — PAIN DESCRIPTION - LOCATION
LOCATION: CHEST
LOCATION: CHEST

## 2022-01-10 ASSESSMENT — PAIN DESCRIPTION - DESCRIPTORS
DESCRIPTORS: BURNING;ACHING;CRAMPING
DESCRIPTORS: ACHING

## 2022-01-10 ASSESSMENT — PAIN DESCRIPTION - ORIENTATION: ORIENTATION: MID

## 2022-01-10 NOTE — H&P
[address] [facility]  History and Physical      CHIEF COMPLAINT:    Chief Complaint   Patient presents with    Chest Pain     with pressure, was being treated for PNA        Patient of Natalia Li MD presents with:  Pericarditis    History of Present Illness:   Patient complains of several days of runny nose, dry nonproductive cough, and worsening shortness of breath with the dull nonradiating anterior chest discomfort that is sometimes worsened with taking a deep breath. The pain initially was in bilateral shoulders but later coalesced more into the chest.  Presented to the emergency department yesterday and had some mild ST elevations. He went to the Cath Lab showing clean coronaries. His CRP was very elevated. He has a history of lupus for which he is on biologic therapy. He also has a history of rheumatoid arthritis and Sjogren's syndrome. No fevers or other associated symptoms. No modifying factors. Overall feeling quite well today and has been up and walking around without significant dyspnea. REVIEW OF SYSTEMS:  Pertinent negatives are above in HPI. 10 point ROS otherwise negative.       Past Medical History:   Diagnosis Date    Dog bite 08/24/2016    rabies vaccinations s/p dog bite    H/O blood clots     Lupus (HCC)     [facility]          Past Surgical History:   Procedure Laterality Date    APPENDECTOMY      CHOLECYSTECTOMY      KNEE CARTILAGE SURGERY Left 06/2017    MASTOID SURGERY      OTHER SURGICAL HISTORY  4/15/16    radiofrequency ablation left greater saphaneous vein and stab phlebectomies    WISDOM TOOTH EXTRACTION         Medications Prior to Admission:    Medications Prior to Admission: carvedilol (COREG) 6.25 MG tablet, Take 6.25 mg by mouth 2 times daily (with meals)  sacubitril-valsartan (ENTRESTO) 24-26 MG per tablet, Take 1 tablet by mouth 2 times daily  rivaroxaban (XARELTO) 20 MG TABS tablet, Take 20 mg by mouth daily (with breakfast)  BENLYSTA 200 MG/ML SOAJ, every 7 days     Note that the patient's home medications were reviewed and the above list is accurate to the best of my knowledge at the time of the exam.    Allergies:    Patient has no known allergies. Social History:    reports that he quit smoking about 27 years ago. He smoked 0.50 packs per day. His smokeless tobacco use includes chew. He reports current alcohol use of about 6.0 standard drinks of alcohol per week. He reports that he does not use drugs. Family History:   family history includes Cancer in his maternal grandfather. PHYSICAL EXAM:    Vitals:  BP (!) 109/56   Pulse 72   Temp 98 °F (36.7 °C)   Resp 16   Ht 6' 3\" (1.905 m)   Wt 222 lb (100.7 kg)   SpO2 95%   BMI 27.75 kg/m²       General appearance: NAD, conversant  Eyes: Sclerae anicteric, PERRLA  HEENT: AT/NC, MMM  Neck: FROM, supple, no thyromegaly  Lymph: No cervical / supraclavicular lymphadenopathy  Lungs: Clear to auscultation, WOB normal  CV: RRR, no MRGs, no lower extremity edema  Abdomen: Soft, non-tender; no masses or HSM, +BS  Extremities: FROM without synovitis. No clubbing or cyanosis of the hands. Skin: no rash, induration, lesions, or ulcers  Psych: Calm and cooperative. Normal judgement and insight. Normal mood and affect. Neuro: Alert and interactive, face symmetric, speech fluent. LABS:  All labs reviewed.   Of note:  CBC with Differential:    Lab Results   Component Value Date    WBC 1.9 01/09/2022    RBC 4.30 01/09/2022    HGB 12.4 01/09/2022    HCT 37.4 01/09/2022     01/09/2022    MCV 87.0 01/09/2022    MCH 28.8 01/09/2022    MCHC 33.2 01/09/2022    RDW 13.8 01/09/2022    LYMPHOPCT 27.7 10/02/2021    MONOPCT 21.8 10/02/2021    BASOPCT 1.0 10/02/2021    MONOSABS 0.22 10/02/2021    LYMPHSABS 0.28 10/02/2021    EOSABS 0.02 10/02/2021    BASOSABS 0.01 10/02/2021     CMP:    Lab Results   Component Value Date     01/09/2022    K 4.1 01/09/2022    CL 95 01/09/2022    CO2 26 01/09/2022    BUN 20 01/09/2022    CREATININE 1.5 01/09/2022    GFRAA >60 01/09/2022    LABGLOM 50 01/09/2022    GLUCOSE 139 01/09/2022    PROT 7.4 01/09/2022    LABALBU 2.9 01/09/2022    CALCIUM 9.0 01/09/2022    BILITOT 0.8 01/09/2022    ALKPHOS 128 01/09/2022    AST 25 01/09/2022    ALT 20 01/09/2022       Imaging:  I've personally reviewed the patient's CXR: clear    EKG:  I've personally reviewed the patient's EKG:  NSR with mild lateral STD's    ASSESSMENT/PLAN:  Principal Problem:    Pericarditis  Active Problems:    Rheumatoid arthritis (HCC)    Lupus (HCC)    Chest pain    Pericardial effusion    FRANCISCO (acute kidney injury) ([facility]  Resolved Problems:    * No resolved hospital problems. *      History of lupus and RA on biologic, now with severe pericardial effusion, clean cath, elevated CRP consistent with pericarditis    Check DUSTY, dsDNA titer, C3, C4. FRANCISCO noted. Hold Entresto. IV fluids. We should use steroid burst rather than NSAID since this very likely could be a flare of his SLE, plus he has an FRANCISCO    D/w his rheumatologist.  She recommends IV solumedrol 200 mg x1, then 60 mg/day pred x 5, tapering 10 mg every 5 days, and they will see him soon as outpatient.     DVT prophylaxis: On xareto  Code status: Full  Requires inpatient level of care  [place] Chu MD    7:01 AM  1/10/2022

## 2022-01-10 NOTE — DISCHARGE SUMMARY
Physician Discharge Summary     Patient ID:  Kev Quintero  [id number]  54 y.o.  1970    Admit date: 1/9/2022    Discharge date and time:  1/10/2022     Admission Diagnoses:   Chief Complaint   Patient presents with    Chest Pain     with pressure, was being treated for PNA      Pericarditis     Discharge Diagnoses:   Principal Problem:    Pericarditis  Active Problems:    Rheumatoid arthritis ([facility]    Lupus (HCC)    Chest pain    Pericardial effusion    FRANCISCO (acute kidney injury) ([facility]  Resolved Problems:    * No resolved hospital problems. *       Consults: cards    Procedures: LHC  1. Absence of epicardial coronary artery stenosis. 2.  LVEDP 16 mmHg. Hospital Course:   Patient presented with chest pain. He was found to have some mild ST elevations. He went for cardiac catheterization which showed clean coronaries. He underwent an echocardiogram which showed a large pericardial effusion without signs of tamponade, but he did have very slightly low blood pressures for which his medications have been adjusted. Overall however, he is hemodynamically stable. He also had a mild FRANCISCO which has improved with fluids and his Loreta Anil has been held. He has history of SLE, RA, and Sjogren's syndrome on biologic therapy. His CRP was very elevated. His COVID is negative. Full viral respiratory panel is pending at this time. I suspect his lupus has flared up causing serositis. I spoke to his outpatient rheumatologist who recommended that we give him a single dose of IV steroids and then put him on a prolonged steroid taper and then he will follow-up with them within a few weeks. He has also been started on colchicine for the pericarditis. I have also started him on Protonix for GI protection. The prednisone, colchicine, and Protonix can all be gradually discontinued as he improves.      Discharge Exam:  Vitals:    01/09/22 2200 01/10/22 0530 01/10/22 0800 01/10/22 1232   BP: (!) 104/56 (!) 109/56 118/61 112/73   Pulse: 88 72 86 83   Resp: 18 16 18 12   Temp: 98 °F (36.7 °C)  97.1 °F (36.2 °C) 98.1 °F (36.7 °C)   TempSrc:   Temporal Oral   SpO2: 95%  95% 98%   Weight:       Height:            General appearance: NAD, conversant  HEENT: AT/NC, MMM  Neck: FROM, supple  Lungs: Clear to auscultation, WOB normal  CV: RRR, no MRGs  Abdomen: Soft, non-tender; no masses or HSM, +BS  Extremities: No peripheral edema or digital cyanosis  Skin: no rash, lesions or ulcers  Psych: Calm and cooperative  Neuro: Alert and interactive, nonfocal     Condition:  Stable     Disposition: home    Patient Instructions:   Current Discharge Medication List      START taking these medications    Details   colchicine (COLCRYS) 0.6 MG tablet Take 1 tablet by mouth 2 times daily  Qty: 60 tablet, Refills: 1      pantoprazole (PROTONIX) 40 MG tablet Take 1 tablet by mouth every morning (before breakfast)  Qty: 30 tablet, Refills: 1      predniSONE (DELTASONE) 20 MG tablet Take 3 tablets by mouth daily for 5 days, THEN 2.5 tablets daily for 5 days, THEN 2 tablets daily for 5 days, THEN 1.5 tablets daily for 5 days. Continue this taper pattern until you see rheumatology. Qty: 45 tablet, Refills: 1         CONTINUE these medications which have CHANGED    Details   carvedilol (COREG) 6.25 MG tablet Take 0.5 tablets by mouth 2 times daily (with meals)  Qty: 60 tablet, Refills: 3         CONTINUE these medications which have NOT CHANGED    Details   rivaroxaban (XARELTO) 20 MG TABS tablet Take 20 mg by mouth daily (with breakfast)      BENLYSTA 200 MG/ML SOAJ every 7 days          STOP taking these medications       sacubitril-valsartan (ENTRESTO) 24-26 MG per tablet Comments:   Reason for Stopping:             Activity: activity as tolerated  Diet: regular diet    Follow-up with PCP in 1 week.     Note that over 30 minutes was spent in preparing discharge papers, discussing discharge with patient, medication review, etc.    Signed:  Nathen Aguilar MD 1/10/2022  2:01 PM

## 2022-01-10 NOTE — PATIENT CARE CONFERENCE
[facility] Quality Flow/Interdisciplinary Rounds Progress Note        Quality Flow Rounds held on January 10, 2022    Disciplines Attending:  Bedside Nurse, ,  and Nursing Unit Leadership    Fariha Agrawal was admitted on 1/9/2022 11:13 AM    Anticipated Discharge Date:  Expected Discharge Date: 01/10/22    Disposition:    Rowdy Score:  Rowdy Scale Score: 22    Readmission Risk              Risk of Unplanned Readmission:  10           Discussed patient goal for the day, patient clinical progression, and barriers to discharge.   The following Goal(s) of the Day/Commitment(s) have been identified:  Labs - Report Results      Bushra Cadet RN  January 10, 2022

## 2022-01-10 NOTE — PROGRESS NOTES
Inpatient Cardiology Progress Note     PATIENT IS BEING FOLLOWED FOR: chest pain    Pj Wei is a 54 y.o. male who was seen in consultation by Dr. Rob Dimas on 2022. SUBJECTIVE: + CP with deep inspiration  OBJECTIVE: No apparent distress     ROS:  Consist: Denies fevers, chills or night sweats  Heart: Denies chest pain, palpitations, lightheadedness, dizziness or syncope  Lungs: Denies SOB, cough, wheezing, orthopnea or PND  GI: Denies abdominal pain, vomiting or diarrhea  All others negative     PHYSICAL EXAM:   BP (!) 109/56   Pulse 72   Temp 98 °F (36.7 °C)   Resp 16   Ht 6' 3\" (1.905 m)   Wt 222 lb (100.7 kg)   SpO2 95%   BMI 27.75 kg/m²    B/P Range last 24 hours: Systolic (05QDU), TFH:86 , Min:82 , EBX:148    Diastolic (21FYT), X, Min:49, Max:63    CONST: Well developed, well nourished middle aged male who appears of stated age. Awake, alert and cooperative. No apparent distress  HEENT:   Head- Normocephalic, atraumatic   Throat- Oral mucosa pink and moist  Neck-  No stridor, trachea midline, no jugular venous distention. No carotid bruit  CHEST: Chest symmetrical and non-tender to palpation. No accessory muscle use or intercostal retractions  RESPIRATORY:  Lung sounds - clear throughout fields   CARDIOVASCULAR:     Heart Inspection- shows no noted pulsations  Heart Palpation- no heaves or thrills; PMI is non-displaced   Heart Ausculation- Regular rate and rhythm, no murmur. No s3, s4 or rub   PV: No lower extremity edema. No varicosities. Pedal pulses palpable, no clubbing or cyanosis   ABDOMEN: Soft, non-tender to light palpation. Bowel sounds present. No palpable masses no organomegaly; no abdominal bruit  MS: Good muscle strength and tone. No atrophy or abnormal movements. : Deferred  SKIN: Warm and dry no statis dermatitis or ulcers   NEURO / PSYCH: Oriented to person, place and time. Speech clear and appropriate. Follows all commands.  Pleasant affect     No intake or output data in the 24 hours ending 01/10/22 1004    Weight:   Wt Readings from Last 3 Encounters:   01/09/22 222 lb (100.7 kg)   10/01/21 225 lb (102.1 kg)   06/18/21 242 lb (109.8 kg)     Current Inpatient Medications:   colchicine  0.6 mg Oral BID    rivaroxaban  20 mg Oral Daily with breakfast    [Held by provider] sacubitril-valsartan  1 tablet Oral BID    ibuprofen  400 mg Oral TID WC    sodium chloride flush  5-40 mL IntraVENous 2 times per day    pantoprazole  40 mg Oral QAM AC       IV Infusions (if any):   lactated ringers 125 mL/hr at 01/10/22 0843    sodium chloride         DIAGNOSTIC/ LABORATORY DATA:  Labs:   CBC:   Recent Labs     01/09/22  1136 01/10/22  0657   WBC 1.9* 1.4*   HGB 12.4* 12.1*   HCT 37.4 37.5    123*     BMP:   Recent Labs     01/09/22  1136 01/10/22  0657    134   K 4.1 3.8   CO2 26 23   BUN 20 21*   CREATININE 1.5* 1.0   LABGLOM 50 >60   CALCIUM 9.0 9.0     APTT:  Recent Labs     01/09/22  1136   APTT 33.1     CARDIAC ENZYMES:  Recent Labs     01/09/22  1136   TROPHS 20*     LIVER PROFILE:  Recent Labs     01/09/22  1136   AST 25   ALT 20   LABALBU 2.9*       CXR 1/09/2021:   FINDINGS:  Cardiomediastinal silhouette: No cardiomegaly or obvious acute process is  identified. Lungs/pleura: No acute pulmonary infiltrate, pleural effusion, or  pneumothorax is identified.   There are medial basilar areas of mild  increased density most likely atelectasis and or bronchovascular crowding  Other: Right hemidiaphragm is elevated.  Overlying monitoring wires/ leads  are present. 12 lead EKG 1/10/2021: SR, 81 bpm with persistent ST elevation     Left heart cath 1/09/2022 (Dr. Baldemar Engle): FINDINGS:  HEMODYNAMICS:  1. Aortic pressure 100/51 mmHg. 2.  Left ventricular end-diastolic pressure of 16 mmHg. CORONARY ANATOMY:  LEFT MAIN:  It is a large and short artery with no angiographic stenosis noted.   LAD:  It is a large artery wrapping around the apex and giving rise to two large septal  and two very small diagonal branches. The LAD tapers in size in the apical segment. No angiographic stenosis noted. LEFT CIRCUMFLEX:  It is a large artery giving rise to a large first obtuse marginal branch, a probable SA kuldeep branch, and a small bifurcating second obtuse marginal branch. No angiographic stenosis noted. RCA:  It is a large artery which has an anterior takeoff. It gives rise to a conus branch, two RV marginal branches, a PDA and a small PLV branch. No angiographic stenosis noted. IMPRESSION:  1. Absence of epicardial coronary artery stenosis. 2.  LVEDP 16 mmHg. Echo 1/09/2022 (Dr. Niecy Alexander):   Summary   Technically adequate study. Mild aortic regurgitation. Large circumferential pericardial effusion without tamponade physiology. EF 55-60%. ASSESSMENT:   1. Chest pain consistent with pericarditis with no epicardial coronary disease on catheterization   2. Large pericardial effusion without tamponade  3. Hypotension, resolved   4. Mixed connective tissue disease  5. Reported history of CHF  6. History of right leg DVT: on Xarelto       PLAN:  1. Continue NSAIDs and colchicine  2. Encourage PO fluid intake  3. Resume Coreg and Entresto as blood pressure tolerates  4. Limited echo in one month to evaluate for effusion, earlier if any signs or symptoms of tamponade  5. May be discharged from cardiology stand point when OK with others. Cardiology will sign off. Please call if needed.      Electronically signed by Boaz Child MD on 1/10/2022 at 10:04 AM

## 2022-01-10 NOTE — PROGRESS NOTES
CLINICAL PHARMACY NOTE: MEDS TO BEDS    Total # of Prescriptions Filled: 3   The following medications were delivered to the patient:  · Colchicine 0.6mg  · protonix 40mg  · Prednisone 20mg    Additional Documentation:    Delivered to patient 1/10/22 @4:17pm

## 2022-01-10 NOTE — CARE COORDINATION
1/10 Transition of care planning. Patient was admit from ED on 1/9 for CP. Cardiology was consulted and patient underwent LHC on 1/9 with no intervention. Patient had ECHO on 1/9 showing mild aortic regurgitation, large pericardial effusion and EF of 60%. Patient has history of RLE DVT, on Xarelto. Met with patient at bedside to discuss discharge planning. Patient's PCP is Dr. Holden Solis and pharmacy is [facility] [address] [facility]. Patient lives alone in a 2 story duplex with 3 TYLER. Patient does not use any DME or have a history of HHC/SNEHA. Plan is for patient to return home on discharge and he does not anticipate any needs and one of his parents will provide transportation.     Grzegorz Pelletier RN, BSN  PHYSICIANS [facility] SURGICAL [facility] Case Management   Cell: [phone]

## 2022-01-11 LAB
ANA PATTERN: ABNORMAL
ANA TITER: ABNORMAL
ANTI DNA DOUBLE STRANDED: POSITIVE
ANTI DNA TITER: ABNORMAL
ANTI-NUCLEAR ANTIBODY (ANA): POSITIVE

## 2022-01-13 ENCOUNTER — TELEPHONE (OUTPATIENT)
Dept: CARDIOLOGY CLINIC | Age: 52
End: 2022-01-13

## 2022-01-13 LAB
EKG ATRIAL RATE: 100 BPM
EKG P AXIS: 43 DEGREES
EKG P-R INTERVAL: 212 MS
EKG Q-T INTERVAL: 336 MS
EKG QRS DURATION: 86 MS
EKG QTC CALCULATION (BAZETT): 433 MS
EKG R AXIS: 80 DEGREES
EKG T AXIS: 69 DEGREES
EKG VENTRICULAR RATE: 100 BPM

## 2022-01-13 PROCEDURE — 93010 ELECTROCARDIOGRAM REPORT: CPT | Performed by: INTERNAL MEDICINE

## 2024-08-09 ENCOUNTER — HOSPITAL ENCOUNTER (OUTPATIENT)
Age: 54
Discharge: HOME | End: 2024-08-11

## 2024-08-09 PROCEDURE — 88305 TISSUE EXAM BY PATHOLOGIST: CPT

## 2024-08-16 LAB — SURGICAL PATHOLOGY REPORT: NORMAL
